# Patient Record
Sex: FEMALE | Race: WHITE | NOT HISPANIC OR LATINO | Employment: OTHER | URBAN - METROPOLITAN AREA
[De-identification: names, ages, dates, MRNs, and addresses within clinical notes are randomized per-mention and may not be internally consistent; named-entity substitution may affect disease eponyms.]

---

## 2017-01-01 ENCOUNTER — APPOINTMENT (OUTPATIENT)
Dept: GENERAL RADIOLOGY | Facility: HOSPITAL | Age: 82
End: 2017-01-01

## 2017-01-01 ENCOUNTER — APPOINTMENT (OUTPATIENT)
Dept: MRI IMAGING | Facility: HOSPITAL | Age: 82
End: 2017-01-01
Attending: HOSPITALIST

## 2017-01-01 ENCOUNTER — OFFICE VISIT (OUTPATIENT)
Dept: ORTHOPEDIC SURGERY | Facility: CLINIC | Age: 82
End: 2017-01-01

## 2017-01-01 ENCOUNTER — TELEPHONE (OUTPATIENT)
Dept: NEUROLOGY | Facility: CLINIC | Age: 82
End: 2017-01-01

## 2017-01-01 ENCOUNTER — APPOINTMENT (OUTPATIENT)
Dept: CARDIOLOGY | Facility: HOSPITAL | Age: 82
End: 2017-01-01
Attending: INTERNAL MEDICINE

## 2017-01-01 ENCOUNTER — OFFICE VISIT (OUTPATIENT)
Dept: NEUROLOGY | Facility: CLINIC | Age: 82
End: 2017-01-01

## 2017-01-01 ENCOUNTER — HOSPITAL ENCOUNTER (OUTPATIENT)
Facility: HOSPITAL | Age: 82
Setting detail: OBSERVATION
Discharge: HOME | End: 2017-12-02
Attending: EMERGENCY MEDICINE | Admitting: HOSPITALIST

## 2017-01-01 ENCOUNTER — APPOINTMENT (OUTPATIENT)
Dept: CT IMAGING | Facility: HOSPITAL | Age: 82
End: 2017-01-01

## 2017-01-01 VITALS
DIASTOLIC BLOOD PRESSURE: 78 MMHG | HEIGHT: 63 IN | HEART RATE: 73 BPM | SYSTOLIC BLOOD PRESSURE: 124 MMHG | BODY MASS INDEX: 28 KG/M2 | OXYGEN SATURATION: 93 % | WEIGHT: 158 LBS

## 2017-01-01 VITALS
RESPIRATION RATE: 18 BRPM | SYSTOLIC BLOOD PRESSURE: 156 MMHG | HEART RATE: 77 BPM | BODY MASS INDEX: 27.2 KG/M2 | WEIGHT: 153.5 LBS | DIASTOLIC BLOOD PRESSURE: 74 MMHG | HEIGHT: 63 IN | OXYGEN SATURATION: 97 % | TEMPERATURE: 98.3 F

## 2017-01-01 VITALS — TEMPERATURE: 98 F | HEIGHT: 63 IN

## 2017-01-01 DIAGNOSIS — M25.562 PAIN IN BOTH KNEES, UNSPECIFIED CHRONICITY: Primary | ICD-10-CM

## 2017-01-01 DIAGNOSIS — R53.1 GENERALIZED WEAKNESS: ICD-10-CM

## 2017-01-01 DIAGNOSIS — M25.561 PAIN IN BOTH KNEES, UNSPECIFIED CHRONICITY: Primary | ICD-10-CM

## 2017-01-01 DIAGNOSIS — I35.0 AORTIC VALVE STENOSIS, ETIOLOGY OF CARDIAC VALVE DISEASE UNSPECIFIED: Primary | ICD-10-CM

## 2017-01-01 DIAGNOSIS — R42 VERTIGO: ICD-10-CM

## 2017-01-01 DIAGNOSIS — G30.1 LATE ONSET ALZHEIMER'S DISEASE WITHOUT BEHAVIORAL DISTURBANCE (HCC): Primary | ICD-10-CM

## 2017-01-01 DIAGNOSIS — M17.10 PRIMARY LOCALIZED OSTEOARTHROSIS OF LOWER LEG, UNSPECIFIED LATERALITY: ICD-10-CM

## 2017-01-01 DIAGNOSIS — R55 NEAR SYNCOPE: ICD-10-CM

## 2017-01-01 DIAGNOSIS — F02.80 LATE ONSET ALZHEIMER'S DISEASE WITHOUT BEHAVIORAL DISTURBANCE (HCC): Primary | ICD-10-CM

## 2017-01-01 LAB
ALBUMIN SERPL-MCNC: 3.3 G/DL (ref 3.5–5.2)
ALBUMIN SERPL-MCNC: 3.7 G/DL (ref 3.5–5.2)
ALBUMIN/GLOB SERPL: 1.3 G/DL
ALBUMIN/GLOB SERPL: 1.3 G/DL
ALP SERPL-CCNC: 61 U/L (ref 39–117)
ALP SERPL-CCNC: 74 U/L (ref 39–117)
ALT SERPL W P-5'-P-CCNC: 12 U/L (ref 1–33)
ALT SERPL W P-5'-P-CCNC: 16 U/L (ref 1–33)
ANION GAP SERPL CALCULATED.3IONS-SCNC: 10.1 MMOL/L
ANION GAP SERPL CALCULATED.3IONS-SCNC: 10.7 MMOL/L
ANION GAP SERPL CALCULATED.3IONS-SCNC: 9.9 MMOL/L
AST SERPL-CCNC: 22 U/L (ref 1–32)
AST SERPL-CCNC: 29 U/L (ref 1–32)
BASOPHILS # BLD AUTO: 0.03 10*3/MM3 (ref 0–0.2)
BASOPHILS # BLD AUTO: 0.05 10*3/MM3 (ref 0–0.2)
BASOPHILS # BLD AUTO: 0.07 10*3/MM3 (ref 0–0.2)
BASOPHILS NFR BLD AUTO: 0.3 % (ref 0–1.5)
BASOPHILS NFR BLD AUTO: 0.6 % (ref 0–1.5)
BASOPHILS NFR BLD AUTO: 0.8 % (ref 0–1.5)
BH CV ECHO MEAS - ACS: 0.7 CM
BH CV ECHO MEAS - AI DEC SLOPE: 332.8 CM/SEC^2
BH CV ECHO MEAS - AI MAX PG: 83.2 MMHG
BH CV ECHO MEAS - AI MAX VEL: 456 CM/SEC
BH CV ECHO MEAS - AI P1/2T: 401.4 MSEC
BH CV ECHO MEAS - AO MAX PG (FULL): 36.9 MMHG
BH CV ECHO MEAS - AO MAX PG: 41 MMHG
BH CV ECHO MEAS - AO MEAN PG (FULL): 20 MMHG
BH CV ECHO MEAS - AO MEAN PG: 22 MMHG
BH CV ECHO MEAS - AO ROOT AREA (BSA CORRECTED): 2.1
BH CV ECHO MEAS - AO ROOT AREA: 10.8 CM^2
BH CV ECHO MEAS - AO ROOT DIAM: 3.7 CM
BH CV ECHO MEAS - AO V2 MAX: 320 CM/SEC
BH CV ECHO MEAS - AO V2 MEAN: 224 CM/SEC
BH CV ECHO MEAS - AO V2 VTI: 80.5 CM
BH CV ECHO MEAS - AVA(I,A): 1.1 CM^2
BH CV ECHO MEAS - AVA(I,D): 1.1 CM^2
BH CV ECHO MEAS - AVA(V,A): 1.2 CM^2
BH CV ECHO MEAS - AVA(V,D): 1.2 CM^2
BH CV ECHO MEAS - BSA(HAYCOCK): 1.8 M^2
BH CV ECHO MEAS - BSA: 1.7 M^2
BH CV ECHO MEAS - BZI_BMI: 27.5 KILOGRAMS/M^2
BH CV ECHO MEAS - BZI_METRIC_HEIGHT: 160 CM
BH CV ECHO MEAS - BZI_METRIC_WEIGHT: 70.3 KG
BH CV ECHO MEAS - CONTRAST EF (2CH): 59 ML/M^2
BH CV ECHO MEAS - CONTRAST EF 4CH: 60.5 ML/M^2
BH CV ECHO MEAS - EDV(CUBED): 148.9 ML
BH CV ECHO MEAS - EDV(MOD-SP2): 78 ML
BH CV ECHO MEAS - EDV(MOD-SP4): 114 ML
BH CV ECHO MEAS - EDV(TEICH): 135.3 ML
BH CV ECHO MEAS - EF(CUBED): 53.7 %
BH CV ECHO MEAS - EF(MOD-SP2): 59 %
BH CV ECHO MEAS - EF(MOD-SP4): 60.5 %
BH CV ECHO MEAS - EF(TEICH): 45.2 %
BH CV ECHO MEAS - ESV(CUBED): 68.9 ML
BH CV ECHO MEAS - ESV(MOD-SP2): 32 ML
BH CV ECHO MEAS - ESV(MOD-SP4): 45 ML
BH CV ECHO MEAS - ESV(TEICH): 74.2 ML
BH CV ECHO MEAS - FS: 22.6 %
BH CV ECHO MEAS - IVS/LVPW: 1.1
BH CV ECHO MEAS - IVSD: 1 CM
BH CV ECHO MEAS - LAT PEAK E' VEL: 6 CM/SEC
BH CV ECHO MEAS - LV DIASTOLIC VOL/BSA (35-75): 65.7 ML/M^2
BH CV ECHO MEAS - LV MASS(C)D: 187.3 GRAMS
BH CV ECHO MEAS - LV MASS(C)DI: 107.9 GRAMS/M^2
BH CV ECHO MEAS - LV MAX PG: 4.1 MMHG
BH CV ECHO MEAS - LV MEAN PG: 2 MMHG
BH CV ECHO MEAS - LV SYSTOLIC VOL/BSA (12-30): 25.9 ML/M^2
BH CV ECHO MEAS - LV V1 MAX: 101 CM/SEC
BH CV ECHO MEAS - LV V1 MEAN: 65.3 CM/SEC
BH CV ECHO MEAS - LV V1 VTI: 23 CM
BH CV ECHO MEAS - LVIDD: 5.3 CM
BH CV ECHO MEAS - LVIDS: 4.1 CM
BH CV ECHO MEAS - LVLD AP2: 7.7 CM
BH CV ECHO MEAS - LVLD AP4: 7.7 CM
BH CV ECHO MEAS - LVLS AP2: 6.9 CM
BH CV ECHO MEAS - LVLS AP4: 6.6 CM
BH CV ECHO MEAS - LVOT AREA (M): 3.8 CM^2
BH CV ECHO MEAS - LVOT AREA: 3.8 CM^2
BH CV ECHO MEAS - LVOT DIAM: 2.2 CM
BH CV ECHO MEAS - LVPWD: 0.9 CM
BH CV ECHO MEAS - MED PEAK E' VEL: 5 CM/SEC
BH CV ECHO MEAS - MV A DUR: 0.18 SEC
BH CV ECHO MEAS - MV A MAX VEL: 109 CM/SEC
BH CV ECHO MEAS - MV DEC SLOPE: 467 CM/SEC^2
BH CV ECHO MEAS - MV DEC TIME: 0.23 SEC
BH CV ECHO MEAS - MV E MAX VEL: 84.9 CM/SEC
BH CV ECHO MEAS - MV E/A: 0.78
BH CV ECHO MEAS - MV MAX PG: 6.5 MMHG
BH CV ECHO MEAS - MV MEAN PG: 2 MMHG
BH CV ECHO MEAS - MV P1/2T MAX VEL: 102 CM/SEC
BH CV ECHO MEAS - MV P1/2T: 64 MSEC
BH CV ECHO MEAS - MV V2 MAX: 127 CM/SEC
BH CV ECHO MEAS - MV V2 MEAN: 64.9 CM/SEC
BH CV ECHO MEAS - MV V2 VTI: 31.8 CM
BH CV ECHO MEAS - MVA P1/2T LCG: 2.2 CM^2
BH CV ECHO MEAS - MVA(P1/2T): 3.4 CM^2
BH CV ECHO MEAS - MVA(VTI): 2.7 CM^2
BH CV ECHO MEAS - PA ACC TIME: 0.11 SEC
BH CV ECHO MEAS - PA MAX PG (FULL): 5.5 MMHG
BH CV ECHO MEAS - PA MAX PG: 7 MMHG
BH CV ECHO MEAS - PA PR(ACCEL): 28.2 MMHG
BH CV ECHO MEAS - PA V2 MAX: 132 CM/SEC
BH CV ECHO MEAS - PULM A REVS DUR: 0.17 SEC
BH CV ECHO MEAS - PULM A REVS VEL: 39.3 CM/SEC
BH CV ECHO MEAS - PULM DIAS VEL: 54.3 CM/SEC
BH CV ECHO MEAS - PULM S/D: 1.5
BH CV ECHO MEAS - PULM SYS VEL: 83.9 CM/SEC
BH CV ECHO MEAS - PVA(V,A): 1.6 CM^2
BH CV ECHO MEAS - PVA(V,D): 1.6 CM^2
BH CV ECHO MEAS - QP/QS: 0.48
BH CV ECHO MEAS - RAP SYSTOLE: 3 MMHG
BH CV ECHO MEAS - RV MAX PG: 1.5 MMHG
BH CV ECHO MEAS - RV MEAN PG: 1 MMHG
BH CV ECHO MEAS - RV V1 MAX: 61.3 CM/SEC
BH CV ECHO MEAS - RV V1 MEAN: 38.3 CM/SEC
BH CV ECHO MEAS - RV V1 VTI: 12.2 CM
BH CV ECHO MEAS - RVOT AREA: 3.5 CM^2
BH CV ECHO MEAS - RVOT DIAM: 2.1 CM
BH CV ECHO MEAS - RVSP: 30 MMHG
BH CV ECHO MEAS - SI(AO): 498.8 ML/M^2
BH CV ECHO MEAS - SI(CUBED): 46.1 ML/M^2
BH CV ECHO MEAS - SI(LVOT): 50.4 ML/M^2
BH CV ECHO MEAS - SI(MOD-SP2): 26.5 ML/M^2
BH CV ECHO MEAS - SI(MOD-SP4): 39.8 ML/M^2
BH CV ECHO MEAS - SI(TEICH): 35.2 ML/M^2
BH CV ECHO MEAS - SV(AO): 865.5 ML
BH CV ECHO MEAS - SV(CUBED): 80 ML
BH CV ECHO MEAS - SV(LVOT): 87.4 ML
BH CV ECHO MEAS - SV(MOD-SP2): 46 ML
BH CV ECHO MEAS - SV(MOD-SP4): 69 ML
BH CV ECHO MEAS - SV(RVOT): 42.3 ML
BH CV ECHO MEAS - SV(TEICH): 61.1 ML
BH CV ECHO MEAS - TAPSE (>1.6): 2.3 CM2
BH CV ECHO MEAS - TR MAX VEL: 258 CM/SEC
BH CV VAS BP RIGHT ARM: NORMAL MMHG
BH CV XLRA - RV BASE: 4.3 CM
BH CV XLRA - RV LENGTH: 6.9 CM
BH CV XLRA - RV MID: 2.2 CM
BH CV XLRA - TDI S': 14 CM/SEC
BILIRUB SERPL-MCNC: 0.5 MG/DL (ref 0.1–1.2)
BILIRUB SERPL-MCNC: 0.5 MG/DL (ref 0.1–1.2)
BILIRUB UR QL STRIP: NEGATIVE
BUN BLD-MCNC: 10 MG/DL (ref 8–23)
BUN BLD-MCNC: 10 MG/DL (ref 8–23)
BUN BLD-MCNC: 7 MG/DL (ref 8–23)
BUN/CREAT SERPL: 13.7 (ref 7–25)
BUN/CREAT SERPL: 15.9 (ref 7–25)
BUN/CREAT SERPL: 16.7 (ref 7–25)
CALCIUM SPEC-SCNC: 8.5 MG/DL (ref 8.6–10.5)
CALCIUM SPEC-SCNC: 8.8 MG/DL (ref 8.6–10.5)
CALCIUM SPEC-SCNC: 9.1 MG/DL (ref 8.6–10.5)
CHLORIDE SERPL-SCNC: 101 MMOL/L (ref 98–107)
CHLORIDE SERPL-SCNC: 104 MMOL/L (ref 98–107)
CHLORIDE SERPL-SCNC: 109 MMOL/L (ref 98–107)
CHOLEST SERPL-MCNC: 160 MG/DL (ref 0–200)
CLARITY UR: ABNORMAL
CO2 SERPL-SCNC: 26.3 MMOL/L (ref 22–29)
CO2 SERPL-SCNC: 28.9 MMOL/L (ref 22–29)
CO2 SERPL-SCNC: 30.1 MMOL/L (ref 22–29)
COLOR UR: YELLOW
CREAT BLD-MCNC: 0.51 MG/DL (ref 0.57–1)
CREAT BLD-MCNC: 0.6 MG/DL (ref 0.57–1)
CREAT BLD-MCNC: 0.63 MG/DL (ref 0.57–1)
DEPRECATED RDW RBC AUTO: 55.2 FL (ref 37–54)
DEPRECATED RDW RBC AUTO: 55.3 FL (ref 37–54)
DEPRECATED RDW RBC AUTO: 56.2 FL (ref 37–54)
E/E' RATIO: 16
EOSINOPHIL # BLD AUTO: 0.14 10*3/MM3 (ref 0–0.7)
EOSINOPHIL # BLD AUTO: 0.18 10*3/MM3 (ref 0–0.7)
EOSINOPHIL # BLD AUTO: 0.23 10*3/MM3 (ref 0–0.7)
EOSINOPHIL NFR BLD AUTO: 1.5 % (ref 0.3–6.2)
EOSINOPHIL NFR BLD AUTO: 2.2 % (ref 0.3–6.2)
EOSINOPHIL NFR BLD AUTO: 2.8 % (ref 0.3–6.2)
ERYTHROCYTE [DISTWIDTH] IN BLOOD BY AUTOMATED COUNT: 14.9 % (ref 11.7–13)
ERYTHROCYTE [DISTWIDTH] IN BLOOD BY AUTOMATED COUNT: 15 % (ref 11.7–13)
ERYTHROCYTE [DISTWIDTH] IN BLOOD BY AUTOMATED COUNT: 15.1 % (ref 11.7–13)
GFR SERPL CREATININE-BSD FRML MDRD: 115 ML/MIN/1.73
GFR SERPL CREATININE-BSD FRML MDRD: 90 ML/MIN/1.73
GFR SERPL CREATININE-BSD FRML MDRD: 96 ML/MIN/1.73
GLOBULIN UR ELPH-MCNC: 2.5 GM/DL
GLOBULIN UR ELPH-MCNC: 2.9 GM/DL
GLUCOSE BLD-MCNC: 79 MG/DL (ref 65–99)
GLUCOSE BLD-MCNC: 88 MG/DL (ref 65–99)
GLUCOSE BLD-MCNC: 93 MG/DL (ref 65–99)
GLUCOSE UR STRIP-MCNC: NEGATIVE MG/DL
HBA1C MFR BLD: 4.61 % (ref 4.8–5.6)
HCT VFR BLD AUTO: 46.1 % (ref 35.6–45.5)
HCT VFR BLD AUTO: 48.2 % (ref 35.6–45.5)
HCT VFR BLD AUTO: 51.4 % (ref 35.6–45.5)
HDLC SERPL-MCNC: 69 MG/DL (ref 40–60)
HGB BLD-MCNC: 14.6 G/DL (ref 11.9–15.5)
HGB BLD-MCNC: 15.9 G/DL (ref 11.9–15.5)
HGB BLD-MCNC: 17.3 G/DL (ref 11.9–15.5)
HGB UR QL STRIP.AUTO: NEGATIVE
IMM GRANULOCYTES # BLD: 0 10*3/MM3 (ref 0–0.03)
IMM GRANULOCYTES # BLD: 0 10*3/MM3 (ref 0–0.03)
IMM GRANULOCYTES # BLD: 0.02 10*3/MM3 (ref 0–0.03)
IMM GRANULOCYTES NFR BLD: 0 % (ref 0–0.5)
IMM GRANULOCYTES NFR BLD: 0 % (ref 0–0.5)
IMM GRANULOCYTES NFR BLD: 0.2 % (ref 0–0.5)
KETONES UR QL STRIP: NEGATIVE
LDLC SERPL CALC-MCNC: 79 MG/DL (ref 0–100)
LDLC/HDLC SERPL: 1.14 {RATIO}
LEFT ATRIUM VOLUME INDEX: 33 ML/M2
LEFT ATRIUM VOLUME: 53 CM3
LEUKOCYTE ESTERASE UR QL STRIP.AUTO: NEGATIVE
LYMPHOCYTES # BLD AUTO: 1.97 10*3/MM3 (ref 0.9–4.8)
LYMPHOCYTES # BLD AUTO: 2.42 10*3/MM3 (ref 0.9–4.8)
LYMPHOCYTES # BLD AUTO: 2.62 10*3/MM3 (ref 0.9–4.8)
LYMPHOCYTES NFR BLD AUTO: 24.1 % (ref 19.6–45.3)
LYMPHOCYTES NFR BLD AUTO: 28.4 % (ref 19.6–45.3)
LYMPHOCYTES NFR BLD AUTO: 29.3 % (ref 19.6–45.3)
MCH RBC QN AUTO: 32.4 PG (ref 26.9–32)
MCH RBC QN AUTO: 33.5 PG (ref 26.9–32)
MCH RBC QN AUTO: 34 PG (ref 26.9–32)
MCHC RBC AUTO-ENTMCNC: 31.7 G/DL (ref 32.4–36.3)
MCHC RBC AUTO-ENTMCNC: 33 G/DL (ref 32.4–36.3)
MCHC RBC AUTO-ENTMCNC: 33.7 G/DL (ref 32.4–36.3)
MCV RBC AUTO: 101 FL (ref 80.5–98.2)
MCV RBC AUTO: 101.5 FL (ref 80.5–98.2)
MCV RBC AUTO: 102.4 FL (ref 80.5–98.2)
MONOCYTES # BLD AUTO: 1.01 10*3/MM3 (ref 0.2–1.2)
MONOCYTES # BLD AUTO: 1.04 10*3/MM3 (ref 0.2–1.2)
MONOCYTES # BLD AUTO: 1.12 10*3/MM3 (ref 0.2–1.2)
MONOCYTES NFR BLD AUTO: 10.9 % (ref 5–12)
MONOCYTES NFR BLD AUTO: 12.6 % (ref 5–12)
MONOCYTES NFR BLD AUTO: 13.7 % (ref 5–12)
NEUTROPHILS # BLD AUTO: 4.51 10*3/MM3 (ref 1.9–8.1)
NEUTROPHILS # BLD AUTO: 4.85 10*3/MM3 (ref 1.9–8.1)
NEUTROPHILS # BLD AUTO: 5.42 10*3/MM3 (ref 1.9–8.1)
NEUTROPHILS NFR BLD AUTO: 54.5 % (ref 42.7–76)
NEUTROPHILS NFR BLD AUTO: 58.7 % (ref 42.7–76)
NEUTROPHILS NFR BLD AUTO: 59.4 % (ref 42.7–76)
NITRITE UR QL STRIP: NEGATIVE
NT-PROBNP SERPL-MCNC: 582.5 PG/ML (ref 0–1800)
PH UR STRIP.AUTO: 7.5 [PH] (ref 5–8)
PLATELET # BLD AUTO: 157 10*3/MM3 (ref 140–500)
PLATELET # BLD AUTO: 172 10*3/MM3 (ref 140–500)
PLATELET # BLD AUTO: 177 10*3/MM3 (ref 140–500)
PMV BLD AUTO: 11.1 FL (ref 6–12)
PMV BLD AUTO: 11.1 FL (ref 6–12)
PMV BLD AUTO: 11.2 FL (ref 6–12)
POTASSIUM BLD-SCNC: 3.6 MMOL/L (ref 3.5–5.2)
PROT SERPL-MCNC: 5.8 G/DL (ref 6–8.5)
PROT SERPL-MCNC: 6.6 G/DL (ref 6–8.5)
PROT UR QL STRIP: NEGATIVE
RBC # BLD AUTO: 4.5 10*6/MM3 (ref 3.9–5.2)
RBC # BLD AUTO: 4.75 10*6/MM3 (ref 3.9–5.2)
RBC # BLD AUTO: 5.09 10*6/MM3 (ref 3.9–5.2)
SODIUM BLD-SCNC: 141 MMOL/L (ref 136–145)
SODIUM BLD-SCNC: 143 MMOL/L (ref 136–145)
SODIUM BLD-SCNC: 146 MMOL/L (ref 136–145)
SP GR UR STRIP: 1.01 (ref 1–1.03)
TRIGL SERPL-MCNC: 60 MG/DL (ref 0–150)
TROPONIN T SERPL-MCNC: <0.01 NG/ML (ref 0–0.03)
TSH SERPL DL<=0.05 MIU/L-ACNC: 2.83 MIU/ML (ref 0.27–4.2)
UROBILINOGEN UR QL STRIP: ABNORMAL
VLDLC SERPL-MCNC: 12 MG/DL (ref 5–40)
WBC NRBC COR # BLD: 8.17 10*3/MM3 (ref 4.5–10.7)
WBC NRBC COR # BLD: 8.27 10*3/MM3 (ref 4.5–10.7)
WBC NRBC COR # BLD: 9.24 10*3/MM3 (ref 4.5–10.7)

## 2017-01-01 PROCEDURE — G0378 HOSPITAL OBSERVATION PER HR: HCPCS

## 2017-01-01 PROCEDURE — 80048 BASIC METABOLIC PNL TOTAL CA: CPT | Performed by: HOSPITALIST

## 2017-01-01 PROCEDURE — 80053 COMPREHEN METABOLIC PANEL: CPT | Performed by: HOSPITALIST

## 2017-01-01 PROCEDURE — 94640 AIRWAY INHALATION TREATMENT: CPT

## 2017-01-01 PROCEDURE — 81003 URINALYSIS AUTO W/O SCOPE: CPT | Performed by: PHYSICIAN ASSISTANT

## 2017-01-01 PROCEDURE — 93306 TTE W/DOPPLER COMPLETE: CPT | Performed by: INTERNAL MEDICINE

## 2017-01-01 PROCEDURE — 85025 COMPLETE CBC W/AUTO DIFF WBC: CPT | Performed by: PHYSICIAN ASSISTANT

## 2017-01-01 PROCEDURE — 85025 COMPLETE CBC W/AUTO DIFF WBC: CPT | Performed by: HOSPITALIST

## 2017-01-01 PROCEDURE — 84484 ASSAY OF TROPONIN QUANT: CPT | Performed by: PHYSICIAN ASSISTANT

## 2017-01-01 PROCEDURE — 83036 HEMOGLOBIN GLYCOSYLATED A1C: CPT | Performed by: HOSPITALIST

## 2017-01-01 PROCEDURE — 83880 ASSAY OF NATRIURETIC PEPTIDE: CPT | Performed by: HOSPITALIST

## 2017-01-01 PROCEDURE — 70450 CT HEAD/BRAIN W/O DYE: CPT

## 2017-01-01 PROCEDURE — 93010 ELECTROCARDIOGRAM REPORT: CPT | Performed by: INTERNAL MEDICINE

## 2017-01-01 PROCEDURE — 94799 UNLISTED PULMONARY SVC/PX: CPT

## 2017-01-01 PROCEDURE — 70553 MRI BRAIN STEM W/O & W/DYE: CPT

## 2017-01-01 PROCEDURE — 20610 DRAIN/INJ JOINT/BURSA W/O US: CPT | Performed by: ORTHOPAEDIC SURGERY

## 2017-01-01 PROCEDURE — 99204 OFFICE O/P NEW MOD 45 MIN: CPT | Performed by: INTERNAL MEDICINE

## 2017-01-01 PROCEDURE — 84443 ASSAY THYROID STIM HORMONE: CPT | Performed by: HOSPITALIST

## 2017-01-01 PROCEDURE — 96361 HYDRATE IV INFUSION ADD-ON: CPT

## 2017-01-01 PROCEDURE — 25810000003 SODIUM CHLORIDE 0.9 % WITH KCL 20 MEQ 20-0.9 MEQ/L-% SOLUTION: Performed by: HOSPITALIST

## 2017-01-01 PROCEDURE — 99213 OFFICE O/P EST LOW 20 MIN: CPT | Performed by: ORTHOPAEDIC SURGERY

## 2017-01-01 PROCEDURE — 0 GADOBENATE DIMEGLUMINE 529 MG/ML SOLUTION: Performed by: HOSPITALIST

## 2017-01-01 PROCEDURE — 99215 OFFICE O/P EST HI 40 MIN: CPT | Performed by: PSYCHIATRY & NEUROLOGY

## 2017-01-01 PROCEDURE — 0399T ADULT TRANSTHORACIC ECHO COMPLETE W/ CONT IF NECESSARY PER PROTOCOL: CPT | Performed by: INTERNAL MEDICINE

## 2017-01-01 PROCEDURE — 99284 EMERGENCY DEPT VISIT MOD MDM: CPT

## 2017-01-01 PROCEDURE — A9577 INJ MULTIHANCE: HCPCS | Performed by: HOSPITALIST

## 2017-01-01 PROCEDURE — 93005 ELECTROCARDIOGRAM TRACING: CPT | Performed by: PHYSICIAN ASSISTANT

## 2017-01-01 PROCEDURE — 80061 LIPID PANEL: CPT | Performed by: HOSPITALIST

## 2017-01-01 PROCEDURE — 93306 TTE W/DOPPLER COMPLETE: CPT

## 2017-01-01 PROCEDURE — 96360 HYDRATION IV INFUSION INIT: CPT

## 2017-01-01 PROCEDURE — 80053 COMPREHEN METABOLIC PANEL: CPT | Performed by: PHYSICIAN ASSISTANT

## 2017-01-01 PROCEDURE — 71020 HC CHEST PA AND LATERAL: CPT

## 2017-01-01 PROCEDURE — 99212 OFFICE O/P EST SF 10 MIN: CPT | Performed by: INTERNAL MEDICINE

## 2017-01-01 PROCEDURE — 73562 X-RAY EXAM OF KNEE 3: CPT | Performed by: ORTHOPAEDIC SURGERY

## 2017-01-01 PROCEDURE — 0399T HC MYOCARDL STRAIN IMAG QUAN ASSMT PER SESS: CPT

## 2017-01-01 RX ORDER — ASPIRIN 81 MG/1
81 TABLET ORAL DAILY
Qty: 30 TABLET | Refills: 0 | Status: SHIPPED | OUTPATIENT
Start: 2017-01-01 | End: 2018-01-01

## 2017-01-01 RX ORDER — SODIUM CHLORIDE AND POTASSIUM CHLORIDE 150; 900 MG/100ML; MG/100ML
75 INJECTION, SOLUTION INTRAVENOUS CONTINUOUS
Status: DISCONTINUED | OUTPATIENT
Start: 2017-01-01 | End: 2017-01-01

## 2017-01-01 RX ORDER — MEMANTINE HYDROCHLORIDE 10 MG/1
10 TABLET ORAL 2 TIMES DAILY
Refills: 0 | Status: ON HOLD | COMMUNITY
Start: 2017-01-01 | End: 2018-01-01

## 2017-01-01 RX ORDER — ATORVASTATIN CALCIUM 20 MG/1
40 TABLET, FILM COATED ORAL NIGHTLY
Status: DISCONTINUED | OUTPATIENT
Start: 2017-01-01 | End: 2017-01-01

## 2017-01-01 RX ORDER — ATORVASTATIN CALCIUM 10 MG/1
10 TABLET, FILM COATED ORAL NIGHTLY
Status: DISCONTINUED | OUTPATIENT
Start: 2017-01-01 | End: 2017-01-01 | Stop reason: HOSPADM

## 2017-01-01 RX ORDER — BUPIVACAINE HYDROCHLORIDE 5 MG/ML
4 INJECTION, SOLUTION EPIDURAL; INTRACAUDAL
Status: COMPLETED | OUTPATIENT
Start: 2017-01-01 | End: 2017-01-01

## 2017-01-01 RX ORDER — ASPIRIN 325 MG
325 TABLET ORAL ONCE
Status: DISCONTINUED | OUTPATIENT
Start: 2017-01-01 | End: 2017-01-01

## 2017-01-01 RX ORDER — ALPRAZOLAM 0.5 MG/1
0.5 TABLET ORAL 4 TIMES DAILY PRN
Status: DISCONTINUED | OUTPATIENT
Start: 2017-01-01 | End: 2017-01-01

## 2017-01-01 RX ORDER — ATORVASTATIN CALCIUM 10 MG/1
10 TABLET, FILM COATED ORAL NIGHTLY
Qty: 30 TABLET | Refills: 0 | Status: SHIPPED | OUTPATIENT
Start: 2017-01-01 | End: 2018-01-01

## 2017-01-01 RX ORDER — NICOTINE 21 MG/24HR
1 PATCH, TRANSDERMAL 24 HOURS TRANSDERMAL EVERY 24 HOURS
Status: DISCONTINUED | OUTPATIENT
Start: 2017-01-01 | End: 2017-01-01

## 2017-01-01 RX ORDER — AMLODIPINE BESYLATE 10 MG/1
10 TABLET ORAL
Qty: 30 TABLET | Refills: 0 | Status: SHIPPED | OUTPATIENT
Start: 2017-01-01 | End: 2018-01-01

## 2017-01-01 RX ORDER — ASPIRIN 81 MG/1
81 TABLET ORAL DAILY
Status: DISCONTINUED | OUTPATIENT
Start: 2017-01-01 | End: 2017-01-01 | Stop reason: HOSPADM

## 2017-01-01 RX ORDER — AMLODIPINE BESYLATE 5 MG/1
5 TABLET ORAL
Status: DISCONTINUED | OUTPATIENT
Start: 2017-01-01 | End: 2017-01-01

## 2017-01-01 RX ORDER — PANTOPRAZOLE SODIUM 40 MG/1
40 TABLET, DELAYED RELEASE ORAL
Status: DISCONTINUED | OUTPATIENT
Start: 2017-01-01 | End: 2017-01-01 | Stop reason: HOSPADM

## 2017-01-01 RX ORDER — AMLODIPINE BESYLATE 10 MG/1
10 TABLET ORAL
Status: DISCONTINUED | OUTPATIENT
Start: 2017-01-01 | End: 2017-01-01 | Stop reason: HOSPADM

## 2017-01-01 RX ORDER — PANTOPRAZOLE SODIUM 40 MG/1
40 TABLET, DELAYED RELEASE ORAL DAILY
Qty: 30 TABLET | Refills: 0 | Status: SHIPPED | OUTPATIENT
Start: 2017-01-01 | End: 2018-01-01

## 2017-01-01 RX ORDER — MULTIPLE VITAMINS W/ MINERALS TAB 9MG-400MCG
1 TAB ORAL DAILY
Status: DISCONTINUED | OUTPATIENT
Start: 2017-01-01 | End: 2017-01-01 | Stop reason: HOSPADM

## 2017-01-01 RX ORDER — ASPIRIN 300 MG/1
300 SUPPOSITORY RECTAL ONCE
Status: DISCONTINUED | OUTPATIENT
Start: 2017-01-01 | End: 2017-01-01

## 2017-01-01 RX ORDER — IPRATROPIUM BROMIDE AND ALBUTEROL SULFATE 2.5; .5 MG/3ML; MG/3ML
3 SOLUTION RESPIRATORY (INHALATION)
Status: DISCONTINUED | OUTPATIENT
Start: 2017-01-01 | End: 2017-01-01

## 2017-01-01 RX ORDER — METHYLPREDNISOLONE ACETATE 80 MG/ML
80 INJECTION, SUSPENSION INTRA-ARTICULAR; INTRALESIONAL; INTRAMUSCULAR; SOFT TISSUE
Status: COMPLETED | OUTPATIENT
Start: 2017-01-01 | End: 2017-01-01

## 2017-01-01 RX ORDER — CETIRIZINE HYDROCHLORIDE 10 MG/1
10 TABLET ORAL DAILY
Status: DISCONTINUED | OUTPATIENT
Start: 2017-01-01 | End: 2017-01-01

## 2017-01-01 RX ORDER — FEXOFENADINE HCL 180 MG
TABLET ORAL
Refills: 0 | Status: ON HOLD | COMMUNITY
Start: 2017-01-01 | End: 2017-01-01

## 2017-01-01 RX ORDER — SODIUM CHLORIDE 0.9 % (FLUSH) 0.9 %
10 SYRINGE (ML) INJECTION AS NEEDED
Status: DISCONTINUED | OUTPATIENT
Start: 2017-01-01 | End: 2017-01-01 | Stop reason: HOSPADM

## 2017-01-01 RX ORDER — MEMANTINE HYDROCHLORIDE 10 MG/1
10 TABLET ORAL 2 TIMES DAILY
Status: DISCONTINUED | OUTPATIENT
Start: 2017-01-01 | End: 2017-01-01 | Stop reason: HOSPADM

## 2017-01-01 RX ADMIN — METHYLPREDNISOLONE ACETATE 80 MG: 80 INJECTION, SUSPENSION INTRA-ARTICULAR; INTRALESIONAL; INTRAMUSCULAR; SOFT TISSUE at 13:24

## 2017-01-01 RX ADMIN — IPRATROPIUM BROMIDE AND ALBUTEROL SULFATE 3 ML: .5; 3 SOLUTION RESPIRATORY (INHALATION) at 23:04

## 2017-01-01 RX ADMIN — IPRATROPIUM BROMIDE AND ALBUTEROL SULFATE 3 ML: .5; 3 SOLUTION RESPIRATORY (INHALATION) at 16:24

## 2017-01-01 RX ADMIN — IPRATROPIUM BROMIDE AND ALBUTEROL SULFATE 3 ML: .5; 3 SOLUTION RESPIRATORY (INHALATION) at 11:13

## 2017-01-01 RX ADMIN — ALPRAZOLAM 0.5 MG: 0.5 TABLET ORAL at 22:07

## 2017-01-01 RX ADMIN — ALPRAZOLAM 0.5 MG: 0.5 TABLET ORAL at 20:17

## 2017-01-01 RX ADMIN — PANTOPRAZOLE SODIUM 40 MG: 40 TABLET, DELAYED RELEASE ORAL at 06:24

## 2017-01-01 RX ADMIN — AMLODIPINE BESYLATE 10 MG: 10 TABLET ORAL at 08:59

## 2017-01-01 RX ADMIN — BUPIVACAINE HYDROCHLORIDE 4 ML: 5 INJECTION, SOLUTION EPIDURAL; INTRACAUDAL at 13:24

## 2017-01-01 RX ADMIN — MEMANTINE HYDROCHLORIDE 10 MG: 10 TABLET, FILM COATED ORAL at 17:22

## 2017-01-01 RX ADMIN — POTASSIUM CHLORIDE AND SODIUM CHLORIDE 75 ML/HR: 900; 150 INJECTION, SOLUTION INTRAVENOUS at 02:58

## 2017-01-01 RX ADMIN — NICOTINE 1 PATCH: 14 PATCH TRANSDERMAL at 14:54

## 2017-01-01 RX ADMIN — GADOBENATE DIMEGLUMINE 14 ML: 529 INJECTION, SOLUTION INTRAVENOUS at 07:23

## 2017-01-01 RX ADMIN — IPRATROPIUM BROMIDE AND ALBUTEROL SULFATE 3 ML: .5; 3 SOLUTION RESPIRATORY (INHALATION) at 19:24

## 2017-01-01 RX ADMIN — ATORVASTATIN CALCIUM 40 MG: 20 TABLET, FILM COATED ORAL at 20:17

## 2017-01-01 RX ADMIN — IPRATROPIUM BROMIDE AND ALBUTEROL SULFATE 3 ML: .5; 3 SOLUTION RESPIRATORY (INHALATION) at 03:11

## 2017-01-01 RX ADMIN — MULTIPLE VITAMINS W/ MINERALS TAB 1 TABLET: TAB at 08:59

## 2017-01-01 RX ADMIN — MEMANTINE HYDROCHLORIDE 10 MG: 10 TABLET, FILM COATED ORAL at 09:00

## 2017-01-01 RX ADMIN — MULTIPLE VITAMINS W/ MINERALS TAB 1 TABLET: TAB at 14:53

## 2017-01-01 RX ADMIN — AMLODIPINE BESYLATE 5 MG: 5 TABLET ORAL at 09:59

## 2017-01-01 RX ADMIN — PANTOPRAZOLE SODIUM 40 MG: 40 TABLET, DELAYED RELEASE ORAL at 06:09

## 2017-01-01 RX ADMIN — CETIRIZINE HYDROCHLORIDE 10 MG: 10 TABLET, FILM COATED ORAL at 14:52

## 2017-01-01 RX ADMIN — ASPIRIN 81 MG: 81 TABLET ORAL at 09:59

## 2017-01-01 RX ADMIN — PANTOPRAZOLE SODIUM 40 MG: 40 TABLET, DELAYED RELEASE ORAL at 09:59

## 2017-01-01 RX ADMIN — IPRATROPIUM BROMIDE AND ALBUTEROL SULFATE 3 ML: .5; 3 SOLUTION RESPIRATORY (INHALATION) at 07:57

## 2017-01-01 RX ADMIN — CETIRIZINE HYDROCHLORIDE 10 MG: 10 TABLET, FILM COATED ORAL at 08:59

## 2017-01-01 RX ADMIN — ASPIRIN 81 MG: 81 TABLET ORAL at 08:59

## 2017-01-01 RX ADMIN — POTASSIUM CHLORIDE AND SODIUM CHLORIDE 75 ML/HR: 900; 150 INJECTION, SOLUTION INTRAVENOUS at 23:03

## 2017-01-24 ENCOUNTER — TRANSCRIBE ORDERS (OUTPATIENT)
Dept: ADMINISTRATIVE | Facility: HOSPITAL | Age: 82
End: 2017-01-24

## 2017-01-24 DIAGNOSIS — Z12.31 VISIT FOR SCREENING MAMMOGRAM: Primary | ICD-10-CM

## 2017-01-25 ENCOUNTER — APPOINTMENT (OUTPATIENT)
Dept: WOMENS IMAGING | Facility: HOSPITAL | Age: 82
End: 2017-01-25

## 2017-01-25 PROCEDURE — MDREVSPNEW: Performed by: RADIOLOGY

## 2017-01-25 PROCEDURE — G0279 TOMOSYNTHESIS, MAMMO: HCPCS | Performed by: RADIOLOGY

## 2017-01-25 PROCEDURE — G0204 DX MAMMO INCL CAD BI: HCPCS | Performed by: RADIOLOGY

## 2017-04-04 ENCOUNTER — OFFICE VISIT (OUTPATIENT)
Dept: NEUROLOGY | Facility: CLINIC | Age: 82
End: 2017-04-04

## 2017-04-04 VITALS
WEIGHT: 158 LBS | HEIGHT: 63 IN | DIASTOLIC BLOOD PRESSURE: 70 MMHG | BODY MASS INDEX: 28 KG/M2 | SYSTOLIC BLOOD PRESSURE: 140 MMHG

## 2017-04-04 DIAGNOSIS — F02.80 LATE ONSET ALZHEIMER'S DISEASE WITHOUT BEHAVIORAL DISTURBANCE (HCC): Primary | ICD-10-CM

## 2017-04-04 DIAGNOSIS — G30.1 LATE ONSET ALZHEIMER'S DISEASE WITHOUT BEHAVIORAL DISTURBANCE (HCC): Primary | ICD-10-CM

## 2017-04-04 PROCEDURE — 99204 OFFICE O/P NEW MOD 45 MIN: CPT | Performed by: PSYCHIATRY & NEUROLOGY

## 2017-04-04 RX ORDER — FEXOFENADINE HCL 180 MG
TABLET ORAL
Refills: 0 | COMMUNITY
Start: 2017-03-03 | End: 2017-01-01

## 2017-04-04 RX ORDER — FUROSEMIDE 40 MG/1
20 TABLET ORAL DAILY
Refills: 0 | Status: ON HOLD | COMMUNITY
Start: 2017-03-14 | End: 2017-01-01

## 2017-04-04 RX ORDER — AMLODIPINE BESYLATE 5 MG/1
TABLET ORAL
Refills: 0 | COMMUNITY
Start: 2017-02-14 | End: 2017-01-01 | Stop reason: HOSPADM

## 2017-04-04 RX ORDER — MEMANTINE HYDROCHLORIDE 5 MG/1
TABLET ORAL
Refills: 0 | COMMUNITY
Start: 2017-02-20 | End: 2017-01-01

## 2017-04-04 NOTE — PROGRESS NOTES
"CC: Memory loss    HPI:  Vanna Gardner is a  82 y.o.  right-handed white female who I have been asked to see in neurologic consultation by Dr. Ball regarding development of dementia.  She was seen previously by Dr. Aguilera 2013.  She was sent for neuropsychometric testing to Dr. Puckett's office.  She was not diagnosed with MCI or dementia at that time.  She had one test showing some frontal executive function problems involving novel problem solving and mental flexibility.  Depression was diagnosed and treatment was recommended.  Since that time her memory has worsened.  She lives alone but she has a 6 hour per day caregiver who does all of the chores in the home.  She dresses herself dates and eats independently but does not wash dishes clean the house wash\" etc.  She has chronic back pain and bilateral knee pain which affects her walking and she uses a small-based quad cane.  She has urinary incontinence which has been present for at least a couple of years.  She was started on Namenda and has not yet titrated up to the target dose of 10 mg by mouth twice a day.  She indicates she has never been on any other medication for her memory.  She denies any side effects at this point to the medication.    She occasionally awakens from sleep believing she has heard the doorbell.  She does not go to the door.  It does concern her some.  She does not see things which other people do not see.  She denies numbness tingling or burning in her feet.  There is a history of some dementia in her mother who  at age 99.  Her father  at 65 of a motor vehicle accident and did not have any memory issue.  She denies history of significant head trauma, meningitis seizures stroke or syncope        History reviewed. No pertinent past medical history.      Past Surgical History:   Procedure Laterality Date   • BLADDER SURGERY     • EYE SURGERY      eyelid    • HYSTERECTOMY     • TOTAL HIP ARTHROPLASTY             Current " Outpatient Prescriptions:   •  amLODIPine (NORVASC) 5 MG tablet, take 1 tablet by mouth once daily, Disp: , Rfl: 0  •  furosemide (LASIX) 40 MG tablet, , Disp: , Rfl: 0  •  memantine (NAMENDA) 5 MG tablet, take 1 tablet by mouth every morning for 1 week then 1 tablet twi...  (REFER TO PRESCRIPTION NOTES)., Disp: , Rfl: 0  •  Multiple Vitamins-Minerals (MULTIVITAMIN PO), Take  by mouth., Disp: , Rfl:   •  Multiple Vitamins-Minerals (OCUVITE ADULT 50+ PO), Take  by mouth., Disp: , Rfl:   •  RA ALLERGY RELIEF 180 MG tablet, take 1 tablet by mouth once daily, Disp: , Rfl: 0      History reviewed. No pertinent family history.      Social History     Social History   • Marital status:      Spouse name: N/A   • Number of children: N/A   • Years of education: N/A     Occupational History   • Not on file.     Social History Main Topics   • Smoking status: Current Every Day Smoker     Types: Cigarettes   • Smokeless tobacco: Not on file   • Alcohol use Yes   • Drug use: Not on file   • Sexual activity: Not on file     Other Topics Concern   • Not on file     Social History Narrative   • No narrative on file         Allergies   Allergen Reactions   • Codeine      rash   • Hydrochlorothiazide    • Latex    • Nickel    • Penicillins          Pain Scale:5/10, back and knees        ROS:  Review of Systems   Constitutional: Negative for activity change, appetite change and fatigue.   HENT: Negative for ear pain, facial swelling and hearing loss.    Eyes: Positive for visual disturbance. Negative for pain, redness and itching.   Respiratory: Positive for cough. Negative for choking and shortness of breath.    Cardiovascular: Negative for chest pain and leg swelling.   Gastrointestinal: Negative for abdominal pain, nausea and vomiting.   Endocrine: Negative for cold intolerance and heat intolerance.   Genitourinary: Positive for enuresis.   Musculoskeletal: Positive for arthralgias, back pain and gait problem.   Skin: Negative  "for color change, pallor, rash and wound.   Allergic/Immunologic: Negative for environmental allergies and food allergies.   Neurological: Negative for dizziness, tremors, seizures, syncope, facial asymmetry, speech difficulty, weakness, light-headedness, numbness and headaches.   Psychiatric/Behavioral: Positive for agitation, confusion and sleep disturbance. Negative for behavioral problems, decreased concentration, dysphoric mood, hallucinations, self-injury and suicidal ideas. The patient is not nervous/anxious and is not hyperactive.            Physical Exam:  Vitals:    04/04/17 1002   BP: 140/70   Weight: 158 lb (71.7 kg)   Height: 63\" (160 cm)     Body mass index is 27.99 kg/(m^2).    Physical Exam  Gen.: Overweight white female no acute distress  HEENT: Normocephalic no evidence of trauma.  Discs are flat.  She is status post cataract extractions.  No A-V nicking.  Throat negative.  Neck: Supple.  No thyromegaly.  No cervical bruits.  Radial pulses were strong and simultaneous.  Heart: Regular rate and rhythm without murmurs      Neurological Exam:   Mental status: Awake alert oriented and conversant without evidence of an affective disorder thought disorder delusions or hallucinations.  HCF: No aphasia or apraxia or dysarthria.  Mild short-term memory problems noted.  Her Mini-Mental state score was 26.5 with clock draw 3/4 with improper placement of the hands.  Animal naming yielded 8 animals in 1 minute.  She completed at about 35 seconds and then stalled out.  Knowledge of recent events intact.  Cranial nerves: 2-12 intact with some reduction of hearing  Motor: Normal tone arms and legs.  Normal bulk.  Full power in all muscles tested in the arms and legs.  Sensory: Light touch and pinprick are diffusely intact.  Vibration and position sense is intact in the feet.  Cerebellar: Finger to nose, rapid movements, heel-to-shin were intact  Gait and Station: Relatively short steps.  Uses her small base quad " cane.  Some neck flexion forward present        Results:      Lab Results   Component Value Date    GLUCOSE 83 03/04/2014    BUN 10 03/04/2014    CREATININE 0.62 03/04/2014    CO2 25 03/04/2014    CALCIUM 9.2 03/04/2014    ALBUMIN 4.0 03/04/2014    AST 22 03/04/2014    ALT 13 03/04/2014       Lab Results   Component Value Date    WBC 9.05 03/04/2014    HGB 16.5 (H) 03/04/2014    HCT 48.2 (H) 03/04/2014    MCV 95.4 03/04/2014     03/04/2014         .No results found for: RPR      No results found for: TSH, G6UWWLV, P5GAZXP, THYROIDAB      No results found for: YFFDICKS91      No results found for: FOLATE      No results found for: HGBA1C          Assessment:   1.  Memory loss-appears more deficits present currently with some executive dysfunctioning, short-term memory problems and some constructive dyspraxia.  This is most likely early Alzheimer's disease.  She had a previous MRI of the brain which was available Dr. Aguilera but not to me.  He mentioned some small vessel changes but he did not mention hydrocephalus.  She has lab work done through Dr. Ball's office but I do not have that available to me at this time  2.  Gait disturbance-nonspecific.  It is not a parkinsonian gait and does not appear to me at magnetic gait.        Plan:  1.  MRI of the brain  2.  We will obtain labs from Dr. Ball's office.  I am looking for thyroid blood tests B12 and folate in particular.  3.  Return in about 2 months at which point she should have been on full dose Namenda for a while.  Option to place her on a cholinesterase inhibitor such as Aricept, Exelon or galantamine would be made at that time.          At least 30 minutes of this 60 minute consult was used for counseling regarding the diagnosis and treatments which are available                Much of this encounter note is an electronic transcription/translation of spoken language to printed text. The electronic translation of spoken language may permit erroneous,  or at times, nonsensical words or phrases to be inadvertently transcribed; although I have reviewed the note for such errors, some may still exist.

## 2017-04-24 ENCOUNTER — HOSPITAL ENCOUNTER (OUTPATIENT)
Dept: MRI IMAGING | Facility: HOSPITAL | Age: 82
Discharge: HOME | End: 2017-04-24
Attending: PSYCHIATRY & NEUROLOGY | Admitting: PSYCHIATRY & NEUROLOGY

## 2017-04-24 DIAGNOSIS — F02.80 LATE ONSET ALZHEIMER'S DISEASE WITHOUT BEHAVIORAL DISTURBANCE (HCC): ICD-10-CM

## 2017-04-24 DIAGNOSIS — G30.1 LATE ONSET ALZHEIMER'S DISEASE WITHOUT BEHAVIORAL DISTURBANCE (HCC): ICD-10-CM

## 2017-04-24 PROCEDURE — 70551 MRI BRAIN STEM W/O DYE: CPT

## 2017-04-27 ENCOUNTER — TELEPHONE (OUTPATIENT)
Dept: NEUROLOGY | Facility: CLINIC | Age: 82
End: 2017-04-27

## 2017-08-31 PROBLEM — I10 BENIGN ESSENTIAL HTN: Status: ACTIVE | Noted: 2017-01-01

## 2017-09-18 NOTE — TELEPHONE ENCOUNTER
----- Message from Sveta Powell sent at 9/15/2017 12:06 PM EDT -----  Contact: [phone]  Pts son is calling about his mothers medical records from her PCP?  Fabrice  [phone]

## 2017-10-03 NOTE — PROGRESS NOTES
"Bilateral Knee Joint Injection      Patient: Vanna Gardner        YOB: 1934            Chief Complaints:BilateralKnee pain  Chief Complaint   Patient presents with   • Right Knee - Follow-up   • Left Knee - Follow-up           History of Present Illness: Pt gets intermittent  injections with good relief. Is here for repeat injection. Understands options.      Physical Exam: 82 y.o. female  General Appearance:    Alert, cooperative, in no acute distress                   Vitals:    10/03/17 1321   Temp: 98 °F (36.7 °C)   TempSrc: Temporal Artery    Height: 63\" (160 cm)      Patient is alert and read ×3 no acute distress appears her above-listed at height weight and age.  Affect is normal respiratory rate is normal unlabored. Heart rate regular rate rhythm, sclera, dentition and hearing are normal for the purpose of this exam.  Exam and complaints are unchanged.      Procedure:  Large Joint Arthrocentesis  Date/Time: 10/3/2017 1:23 PM  Consent given by: patient  Site marked: site marked  Timeout: Immediately prior to procedure a time out was called to verify the correct patient, procedure, equipment, support staff and site/side marked as required   Supporting Documentation  Indications: pain   Procedure Details  Location: knee - L knee  Needle size: 25 G  Approach: anteromedial  Medications administered: 80 mg methylPREDNISolone acetate 80 MG/ML; 4 mL bupivacaine (PF) 0.5 %      Large Joint Arthrocentesis  Date/Time: 10/3/2017 1:23 PM  Consent given by: patient  Site marked: site marked  Timeout: Immediately prior to procedure a time out was called to verify the correct patient, procedure, equipment, support staff and site/side marked as required   Supporting Documentation  Indications: pain   Procedure Details  Location: knee - R knee  Needle size: 25 G  Approach: anteromedial  Medications administered: 80 mg methylPREDNISolone acetate 80 MG/ML; 4 mL bupivacaine (PF) 0.5 " %                  Assessment. Persistent knee pain      Plan: Is to proceed with injection    The knee joint was injected under strict sterile technique with Marcaine and Depo-Medrol this was done sterilely and tolerated tolerated well.

## 2017-10-06 NOTE — PROGRESS NOTES
New Knee      Patient: Vanna Gardner        YOB: 1934    Medical Record Number: [id number]        Chief Complaints: Right knee pain greater than left knee pain      History of Present Illness: This is a  82 y.o. female who has seen Dr. Lim in the past who presents complaining of bilateral knee pain right is worse and left no new history injury change in activity but progression of her symptoms.  Her symptoms are moderate intermittent some associated swelling but definite limitation of her activities.  No new history of injury.  Her past medical history is remarkable for high blood pressure      Allergies:   Allergies   Allergen Reactions   • Codeine      rash   • Hydrochlorothiazide    • Latex    • Nickel    • Penicillins        Medications:   Home Medications:  Current Outpatient Prescriptions on File Prior to Visit   Medication Sig   • amLODIPine (NORVASC) 5 MG tablet take 1 tablet by mouth once daily   • FEXOFENADINE HCL PO Take 180 tablets by mouth Daily.   • furosemide (LASIX) 40 MG tablet    • memantine (NAMENDA) 10 MG tablet    • Multiple Vitamins-Minerals (MULTIVITAMIN PO) Take  by mouth.   • Multiple Vitamins-Minerals (OCUVITE ADULT 50+ PO) Take  by mouth.     No current facility-administered medications on file prior to visit.      Current Medications:  Scheduled Meds:  Continuous Infusions:  No current facility-administered medications for this visit.   PRN Meds:.    History reviewed. No pertinent past medical history.     Past Surgical History:   Procedure Laterality Date   • BLADDER SURGERY     • EYE SURGERY      eyelid    • HYSTERECTOMY     • TOTAL HIP ARTHROPLASTY          Social History     Occupational History   • Not on file.     Social History Main Topics   • Smoking status: Current Every Day Smoker     Types: Cigarettes   • Smokeless tobacco: Not on file   • Alcohol use Yes   • Drug use: Not on file   • Sexual activity: Not on file    Social History     Social History  "Narrative      History reviewed. No pertinent family history.          Review of Systems: 14 point review of systems are remarkable for the knee pain remainder are negative  Review of Systems      Physical Exam: 82 y.o. female  General Appearance:    Alert, cooperative, in no acute distress                 Vitals:    10/03/17 1321   Temp: 98 °F (36.7 °C)   TempSrc: Temporal Artery    Height: 63\" (160 cm)      Patient is alert and read ×3 no acute distress appears her above-listed at height weight and age.  Affect is normal respiratory rate is normal unlabored. Heart rate regular rate rhythm, sclera, dentition and hearing are normal for the purpose of this exam.        Ortho Exam Physical exam of the right knee reveals no effusion, no erythema.  It mild loss of extension and full flexion  Patient has mild varus alignment.  They have mild tenderness to palpation about the medial compartment, no tenderness laterally..  The patient has a negative bounce home, negative Kyrie and a stable ligamentous exam.  Quad tone is reasonable and symmetric.  There are no overlying skin changes no lymphedema no lymphadenopathy.  There is good hip range of motion which is full symmetric and asymptomatic and a normal ankle exam.  Physical exam of the left knee reveals no effusion, no erythema.  It mild loss of extension and full flexion  Patient has mild varus alignment.  They have mild tenderness to palpation about the medial compartment, no tenderness laterally..  The patient has a negative bounce home, negative Kyrie and a stable ligamentous exam.  Quad tone is reasonable and symmetric.  There are no overlying skin changes no lymphedema no lymphadenopathy.  There is good hip range of motion which is full symmetric and asymptomatic and a normal ankle exam.        Large Joint Arthrocentesis  Date/Time: 10/3/2017 1:24 PM  Consent given by: patient  Site marked: site marked  Timeout: Immediately prior to procedure a time out was " called to verify the correct patient, procedure, equipment, support staff and site/side marked as required   Supporting Documentation  Indications: pain   Procedure Details  Location: knee - R knee  Preparation: Patient was prepped and draped in the usual sterile fashion  Needle size: 25 G  Approach: anteromedial  Medications administered: 80 mg methylPREDNISolone acetate 80 MG/ML; 4 mL bupivacaine (PF) 0.5 %      Large Joint Arthrocentesis  Date/Time: 10/3/2017 1:24 PM  Consent given by: patient  Site marked: site marked  Timeout: Immediately prior to procedure a time out was called to verify the correct patient, procedure, equipment, support staff and site/side marked as required   Supporting Documentation  Indications: pain   Procedure Details  Location: knee - L knee  Needle size: 25 G  Approach: anteromedial  Medications administered: 80 mg methylPREDNISolone acetate 80 MG/ML; 4 mL bupivacaine (PF) 0.5 %                     Radiology:   AP, Lateral and merchant views of the right and left knee  were ordered/reviewed to evauateknee pain.  I have compared to previous films these show tricompartmental OA most pronounced medially with near complete loss of       Assessment/Plan:    Bilateral knee pain with degenerative changes of think she benefit from an injection we are great she is not an operative candidate we talked about options steroid injection Synvisc will start with the steroid of both she understands her options from this point

## 2017-11-16 NOTE — TELEPHONE ENCOUNTER
I spoke with the patient's son Gunner who is her caregiver.  Her daughter was also in town recently and they were looking at long-term care facilities and they've decided to place her probably at [facility] home.  Some telltale signs of worsening and potential hazards have developed such as leaving a pan on the stove when she has actually not cooked for several years.  In addition she did not recall that her mother spent 17 years in a memory care facility with Alzheimer's disease.  I believe their decision is a ruby one since she may be less accepting of a move as her dementia gets worse.    Dustin

## 2017-11-30 PROBLEM — I35.0 AORTIC VALVE STENOSIS: Status: ACTIVE | Noted: 2017-01-01

## 2017-12-01 NOTE — PROGRESS NOTES
Continued Stay Note  [facility]     Patient Name: Vanna Gardner  MRN: [id number]  Today's Date: 12/1/2017    Admit Date: 11/30/2017          Discharge Plan       12/01/17 1848    Case Management/Social Work Plan    Plan If pt ready for DC over the weekend, family/ caregivers will stay with her until a bed on [facility] dementia unit is available.     Additional Comments PT/ OT orders received.  Pt refused PT today.  Spoke with pt's son, Fabrice to discuss DC plans.   Discussed that pt might be ready for DC over the weekend - before a bed is available at [facility] dementia unit.  Fabrice states that his sister is here now; so, if pt is ready for DC over the weekend, they will make sure someone is with pt at all times until  the room on the dementia unit at [place] opens up.  Jeanmarie Concepcion is aware.               Discharge Codes     None            Sabi Herrmann RN

## 2017-12-01 NOTE — PROGRESS NOTES
Discharge Planning Assessment  [facility]     Patient Name: Vanna Gardner  MRN: [id number]  Today's Date: 12/1/2017    Admit Date: 11/30/2017          Discharge Needs Assessment       12/01/17 0835    Living Environment    Lives With alone    Living Arrangements house    Home Accessibility no concerns    Transportation Available family or friend will provide    Living Environment    Primary Care Provided By homecare agency (specify)    Quality Of Family Relationships supportive    Discharge Needs Assessment    Concerns To Be Addressed discharge planning concerns    Equipment Currently Used at Home cane, straight;rollator;shower chair    Discharge Planning Comments Spoke with pt's son, Fabrice at bedside.  Facesheet info confirmed.  Pt lives in a single story house and has pvt pay caregivers 6 hrs/ day, 7 days/ wk.  She ambulates with a rollator walker.  She has had HH in the past, and has been to [facility] for rehab.  Pt's son states he has been talking with [facility] about pt moving to their dementia unit.  He requests referral.  VM message left with Tonya/ [facility].            Discharge Plan       12/01/17 2940    Case Management/Social Work Plan    Plan Pt's son plans for pt to go to [place] [facility] unit upon DC.        Discharge Placement     Facility/Agency Request Status Selected? Address Phone Number Fax Number    [facility] Pending - Request Sent     [address] [address] [phone]        Sabi Herrmann RN 12/1/2017 08:40     to Tonya                           Demographic Summary       12/01/17 2786    Referral Information    Admission Type observation    Arrived From home or self-care    Referral Source admission list    Reason For Consult discharge planning    Record Reviewed medical record    Primary Care Physician Information    Name Ahmet Booth MD            Functional Status       12/01/17 0883    Functional Status  Current    Ambulation 3-->assistive equipment and person    Transferring 3-->assistive equipment and person    Toileting 3-->assistive equipment and person    Bathing 3-->assistive equipment and person    Dressing 3-->assistive equipment and person    Eating 0-->independent    Communication 0-->understands/communicates without difficulty    Swallowing (if score 2 or more for any item, consult Rehab Services) 0-->swallows foods/liquids without difficulty    Functional Status Prior    Ambulation 3-->assistive equipment and person    Transferring 3-->assistive equipment and person    Toileting 3-->assistive equipment and person    Bathing 3-->assistive equipment and person    Dressing 3-->assistive equipment and person    Eating 0-->independent    Communication 0-->understands/communicates without difficulty    Swallowing 0-->swallows foods/liquids without difficulty    IADL    Medications assistive person    Meal Preparation assistive person    Housekeeping assistive person    Laundry assistive person    Shopping assistive person    Oral Care assistive person     Sabi Herrmann RN

## 2017-12-01 NOTE — PLAN OF CARE
Problem: Patient Care Overview (Adult)  Goal: Plan of Care Review  Outcome: Ongoing (interventions implemented as appropriate)    12/01/17 0442   Coping/Psychosocial Response Interventions   Plan Of Care Reviewed With patient   Patient Care Overview   Progress no change   Outcome Evaluation   Outcome Summary/Follow up Plan Patient admitted from ER. Patients vitals stable. Patient denies pain and discomfort. Patient very anxious when arive to floor. Patient was refusing care and verbally abusive to staff. Son reported patient has dementia. Patient settled down and took xanax PRN to helrp her sleep. Patient ambulates with walker and has her own at bedside. Patient has no skin issues. Will continue to monitor.         Problem: Fall Risk (Adult)  Goal: Identify Related Risk Factors and Signs and Symptoms  Outcome: Ongoing (interventions implemented as appropriate)  Goal: Absence of Falls  Outcome: Ongoing (interventions implemented as appropriate)    Problem: Confusion, Acute (Adult)  Goal: Identify Related Risk Factors and Signs and Symptoms  Outcome: Ongoing (interventions implemented as appropriate)  Goal: Cognitive/Functional Impairments Minimized  Outcome: Ongoing (interventions implemented as appropriate)  Goal: Safety  Outcome: Ongoing (interventions implemented as appropriate)

## 2017-12-01 NOTE — H&P
History and physical    Primary care physician  Dr. Ahmet Booth    Chief complaint  Dizziness    History of present illness  82-year-old white female with history of hypertension dementia who smokes brought to the emergency room with episode of dizziness yesterday and she thought she is Passed out.  Patient stated that her head was swimming drinking but no headaches no nausea or chest pain.  Patient also denies any shortness of breath.  Patient evaluated in ER and admitted further workup.  Patient also denies any fever chills but she's been feeling weak.  No visual problems no weakness on either side no speech issues.    PAST MEDICAL HISTORY    • Dementia     • Hypertension           PAST SURGICAL HISTORY   Surgical History          Past Surgical History:   Procedure Laterality Date   • BLADDER SURGERY       • CATARACT EXTRACTION       • EYE SURGERY         eyelid    • HYSTERECTOMY       • SKIN GRAFT       • TOTAL HIP ARTHROPLASTY       • VAGINAL PROLAPSE REPAIR       • VOCAL CORD BIOPSY                FAMILY HISTORY  History reviewed. No pertinent family history.     SOCIAL HISTORY   Social History    Social History            Social History   • Marital status:        Spouse name: N/A   • Number of children: N/A   • Years of education: N/A          Occupational History   • Not on file.             Social History Main Topics   • Smoking status: Current Every Day Smoker       Types: Cigarettes   • Smokeless tobacco: Not on file   • Alcohol use Yes    • Drug use: No   • Sexual activity: Not on file           Other Topics Concern   • Not on file          Social History Narrative   • No narrative on file            ALLERGIES  Codeine; Hydrochlorothiazide; Latex; Nickel; and Penicillins     Home medications reviewed     REVIEW OF SYSTEMS  Review of Systems   Unable to perform ROS: Dementia   Constitutional: Positive for fever (subjective).   Respiratory: Positive for cough.    Neurological: Positive for  "dizziness and weakness (genearlized).      PHYSICAL EXAM  Blood pressure 166/72, pulse 64, temperature 98.2 °F (36.8 °C), temperature source Oral, resp. rate 16, height 63\" (160 cm), weight 149 lb 12.8 oz (67.9 kg), SpO2 92 %.    Constitutional: She is well-developed, well-nourished, and in no distress.   Head: Normocephalic and atraumatic.   Eyes: EOM are normal. Pupils are equal, round, and reactive to light.   Neck: Normal range of motion. No JVD present. Carotid bruit is not present.   Cardiovascular: Normal rate and regular rhythm.    Murmur heard.   Systolic murmur is present with a grade of 1/6   Systolic murmur heard best at right second intercostal space. Good distal pulses.    Pulmonary/Chest: Effort normal. No respiratory distress. She has rhonchi in the right lower field.   Abdominal: Soft. Bowel sounds are normal. There is no tenderness.   Neurological: She is alert. She has normal sensation, normal strength and intact cranial nerves. She displays no weakness and facial symmetry. No cranial nerve deficit. She has a normal Cerebellar Exam. GCS score is 15.   Skin: Skin is warm and dry.   No pitting edema BLE.    Psychiatric: Affect normal.     LAB RESULTS  Lab Results (last 24 hours)     Procedure Component Value Units Date/Time    Urinalysis With / Culture If Indicated - Urine, Catheter [[id number]]  (Abnormal) Collected:  11/30/17 1525    Specimen:  Urine from Urine, Clean Catch Updated:  11/30/17 1550     Color, UA Yellow     Appearance, UA Cloudy (A)     pH, UA 7.5     Specific Gravity, UA 1.010     Glucose, UA Negative     Ketones, UA Negative     Bilirubin, UA Negative     Blood, UA Negative     Protein, UA Negative     Leuk Esterase, UA Negative     Nitrite, UA Negative     Urobilinogen, UA 0.2 E.U./dL    Narrative:       Urine microscopic not indicated.    CBC & Differential [[id number]] Collected:  11/30/17 1534    Specimen:  Blood Updated:  11/30/17 1557    Narrative:       The following orders " were created for panel order CBC & Differential.  Procedure                               Abnormality         Status                     ---------                               -----------         ------                     CBC Auto Differential[[id number]]         Abnormal            Final result                 Please view results for these tests on the individual orders.    CBC Auto Differential [[id number]]  (Abnormal) Collected:  11/30/17 1534    Specimen:  Blood Updated:  11/30/17 1557     WBC 8.17 10*3/mm3      RBC 5.09 10*6/mm3      Hemoglobin 17.3 (H) g/dL      Hematocrit 51.4 (H) %      .0 (H) fL      MCH 34.0 (H) pg      MCHC 33.7 g/dL      RDW 15.0 (H) %      RDW-SD 55.3 (H) fl      MPV 11.1 fL      Platelets 172 10*3/mm3      Neutrophil % 59.4 %      Lymphocyte % 24.1 %      Monocyte % 13.7 (H) %      Eosinophil % 2.2 %      Basophil % 0.6 %      Immature Grans % 0.0 %      Neutrophils, Absolute 4.85 10*3/mm3      Lymphocytes, Absolute 1.97 10*3/mm3      Monocytes, Absolute 1.12 10*3/mm3      Eosinophils, Absolute 0.18 10*3/mm3      Basophils, Absolute 0.05 10*3/mm3      Immature Grans, Absolute 0.00 10*3/mm3     Comprehensive Metabolic Panel [[id number]]  (Abnormal) Collected:  11/30/17 1534    Specimen:  Blood Updated:  11/30/17 1621     Glucose 93 mg/dL      BUN 10 mg/dL      Creatinine 0.60 mg/dL      Sodium 141 mmol/L      Potassium 3.6 mmol/L       Specimen hemolyzed.  Results may be affected.        Chloride 101 mmol/L      CO2 30.1 (H) mmol/L      Calcium 9.1 mg/dL      Total Protein 6.6 g/dL      Albumin 3.70 g/dL      ALT (SGPT) 16 U/L       Specimen hemolyzed.  Results may be affected.        AST (SGOT) 29 U/L       Specimen hemolyzed.  Results may be affected.        Alkaline Phosphatase 74 U/L      Total Bilirubin 0.5 mg/dL      eGFR Non African Amer 96 mL/min/1.73      Globulin 2.9 gm/dL      A/G Ratio 1.3 g/dL      BUN/Creatinine Ratio 16.7     Anion Gap 9.9 mmol/L     Narrative:        The MDRD GFR formula is only valid for adults with stable renal function between ages 18 and 70.    Troponin [[id number]]  (Normal) Collected:  11/30/17 1534    Specimen:  Blood Updated:  11/30/17 1621     Troponin T <0.010 ng/mL       Specimen hemolyzed.  Results may be affected.       Narrative:       Troponin T Reference Ranges:  Less than 0.03 ng/mL:    Negative for AMI  0.03 to 0.09 ng/mL:      Indeterminant for AMI  Greater than 0.09 ng/mL: Positive for AMI    CBC & Differential [[id number]] Collected:  12/01/17 0555    Specimen:  Blood Updated:  12/01/17 0644    Narrative:       The following orders were created for panel order CBC & Differential.  Procedure                               Abnormality         Status                     ---------                               -----------         ------                     CBC Auto Differential[[id number]]        Abnormal            Final result                 Please view results for these tests on the individual orders.    CBC Auto Differential [[id number]]  (Abnormal) Collected:  12/01/17 0555    Specimen:  Blood Updated:  12/01/17 0644     WBC 8.27 10*3/mm3      RBC 4.75 10*6/mm3      Hemoglobin 15.9 (H) g/dL      Hematocrit 48.2 (H) %      .5 (H) fL      MCH 33.5 (H) pg      MCHC 33.0 g/dL      RDW 14.9 (H) %      RDW-SD 55.2 (H) fl      MPV 11.2 fL      Platelets 177 10*3/mm3      Neutrophil % 54.5 %      Lymphocyte % 29.3 %      Monocyte % 12.6 (H) %      Eosinophil % 2.8 %      Basophil % 0.8 %      Immature Grans % 0.0 %      Neutrophils, Absolute 4.51 10*3/mm3      Lymphocytes, Absolute 2.42 10*3/mm3      Monocytes, Absolute 1.04 10*3/mm3      Eosinophils, Absolute 0.23 10*3/mm3      Basophils, Absolute 0.07 10*3/mm3      Immature Grans, Absolute 0.00 10*3/mm3     Basic Metabolic Panel [[id number]]  (Abnormal) Collected:  12/01/17 0555    Specimen:  Blood Updated:  12/01/17 0752     Glucose 79 mg/dL      BUN 7 (L) mg/dL      Creatinine 0.51 (L) mg/dL       Sodium 143 mmol/L      Potassium 3.6 mmol/L      Chloride 104 mmol/L      CO2 28.9 mmol/L      Calcium 8.8 mg/dL      eGFR Non African Amer 115 mL/min/1.73      BUN/Creatinine Ratio 13.7     Anion Gap 10.1 mmol/L     Narrative:       The MDRD GFR formula is only valid for adults with stable renal function between ages 18 and 70.        Imaging Results (last 24 hours)     Procedure Component Value Units Date/Time    XR Chest 2 View [[id number]] Collected:  11/30/17 1643     Updated:  11/30/17 1652    Narrative:       PA AND LATERAL CHEST     HISTORY: Weakness and dizziness. History of hypertension.     COMPARISON: PA and lateral chest 11/20/2014.     FINDINGS: Heart size is enlarged. The thoracic aorta is tortuous. Aortic  vascular calcifications are noted. Chronic increased bronchovascular  markings are present without evidence for pulmonary edema or pleural  effusion or infiltrate. There is no evidence for significant change  compared to the previous examination. Lungs are hyperexpanded and there  is flattening of the hemidiaphragms. Bones appear osteopenic. Cardiac  monitor and leads are noted.       Impression:       Chronic changes within the chest without evidence for acute  disease. COPD.     This report was finalized on 11/30/2017 4:49 PM by Dr. Dandy Gardner MD.       CT Head Without Contrast [[id number]] Collected:  11/30/17 1629     Updated:  11/30/17 1734    Narrative:       CT SCAN OF THE HEAD WITHOUT CONTRAST     CLINICAL HISTORY: Dizziness and weakness. Vertigo.     TECHNIQUE: CT scan of the head was obtained with 3 mm axial images. No  intravenous contrast was administered.     FINDINGS:     There are mild/moderate changes of chronic small vessel ischemic  phenomena. The basal ganglia and thalami are unremarkable in appearance.  There is old lacunar disease involving the lentiform nuclei.  Atherosclerotic changes are identified within the intracranial vascular.  The posterior fossa structures are  otherwise within normal limits.       Impression:          No evidence for acute intracranial pathology. However, if there is  concern for a posterior fossa infarct, further evaluation could be  performed with MR imaging for more sensitive and specific evaluation.     These findings and recommendations were directly discussed with Oziel Son on 11/30/2017 at approximately 3:57 PM.     Radiation dose reduction techniques were utilized, including automated  exposure control and exposure modulation based on body size.     This report was finalized on 11/30/2017 5:31 PM by Dr. Linden Fleming MD.       MRI Brain With & Without Contrast [[id number]] Collected:  12/01/17 0818     Updated:  12/01/17 0832    Narrative:       MRI OF THE BRAIN WITH AND WITHOUT CONTRAST     HISTORY: Dementia. Episode of dizziness and fatigue yesterday while in  kitchen.     MRI of the brain was obtained with sagittal T1, axial T1, axial  postgadolinium fat saturated T1, axial flair, axial T2, axial diffusion,  axial gradient echo, coronal postgadolinium T1 weighted images.     FINDINGS:     There are no abnormal areas of restricted diffusion within the brain  parenchyma. The major intracranial flow-related signal voids are within  normal limits. The ventricles, sulci, and cisterns are age appropriate.  Old lacunar disease is identified within the lentiform nuclei.  Relatively mild changes of chronic small vessel ischemic phenomena are  noted. There are no abnormal areas of susceptibility artifact.     Incidentally noted is a sebaceous cyst just lateral to the left masseter  muscle, and this measures up to approximately 1.3 cm in diameter.       Impression:       1.  No evidence for acute intracranial pathology.  2.  Mild changes of chronic small vessel ischemic phenomena.  3.  Incidentally noted is a sebaceous cyst within the subcutaneous fat  of the soft tissues just lateral to the left masseter muscle.     This report was finalized on  12/1/2017 8:29 AM by Dr. Linden Fleming MD.              ECG 12 Lead            RR Interval= 822 ms  NM Interval= 156 ms  QRSD Interval= 110 ms  QT Interval= 392 ms  QTc Interval= 432 ms  Heart Rate= 73 ms  P Axis= 33 deg  QRS Axis= -26 deg  T Wave Axis= 73 deg  I: 40 Axis= -11 deg  T: 40 Axis= -31 deg  ST Axis= 108 deg  SINUS RHYTHM  NONSPECIFIC INTRAVENTRICULAR CONDUCTION DELAY  LEFT VENTRICULAR HYPERTROPHY  NO PRIOR TRACING AVAILABLE FOR COMPARISON                Current Facility-Administered Medications:   •  ALPRAZolam (XANAX) tablet 0.5 mg, 0.5 mg, Oral, 4x Daily PRN, Mook Oswald MD, 0.5 mg at 11/30/17 2207  •  [START ON 12/2/2017] amLODIPine (NORVASC) tablet 10 mg, 10 mg, Oral, Q24H, Mook Oswald MD  •  aspirin EC tablet 81 mg, 81 mg, Oral, Daily, Mook Oswald MD, 81 mg at 12/01/17 0959  •  cetirizine (zyrTEC) tablet 10 mg, 10 mg, Oral, Daily, Mook Oswald MD  •  ipratropium-albuterol (DUO-NEB) nebulizer solution 3 mL, 3 mL, Nebulization, Q4H - RT, Mook Oswald MD  •  memantine (NAMENDA) tablet 10 mg, 10 mg, Oral, BID, Mook Oswald MD  •  multivitamin with minerals 1 tablet, 1 tablet, Oral, Daily, Mook Oswald MD  •  nicotine (NICODERM CQ) 14 MG/24HR patch 1 patch, 1 patch, Transdermal, Q24H, Mook Oswald MD  •  pantoprazole (PROTONIX) EC tablet 40 mg, 40 mg, Oral, Q AM, Mook Oswald MD, 40 mg at 12/01/17 0959  •  Insert peripheral IV, , , Once **AND** sodium chloride 0.9 % flush 10 mL, 10 mL, Intravenous, PRN, Mike Son III, PA  •  sodium chloride 0.9 % with KCl 20 mEq/L infusion, 75 mL/hr, Intravenous, Continuous, Mook Oswald MD, Last Rate: 75 mL/hr at 12/01/17 0820, 75 mL/hr at 12/01/17 0820     ASSESSMENT  Dizziness  Near syncope  Hypertension  Dementia  Gastroesophageal disease  Anxiety disorder  COPD  Tobacco abuse    PLAN  Admit  Aspirin and Lipitor  Neuro and cardiac workup  IV fluid  Supportive care  PTOT  Stress ulcer DVT prophylaxis  Check 2-D echo  Discussed with son  Follow closely  further recommendation according to hospital course    CHING MAYES MD

## 2017-12-01 NOTE — DISCHARGE PLACEMENT REQUEST
"Vanna Gardner (82 y.o. Female)     Date of Birth Social Security Number Address Home Phone MRN    [id number]  [id number] [facility] [facility] [phone] [id number]    Islam Marital Status          Restorationism        Admission Date Admission Type Admitting Provider Attending Provider Department, Room/Bed    11/30/17 Emergency Mook Oswald MD Ahmed, Aftab, MD [address], [address]    Discharge Date Discharge Disposition Discharge Destination                      Attending Provider: Mook Oswald MD     Allergies:  Codeine, Hydrochlorothiazide, Latex, Nickel, Penicillins    Isolation:  None   Infection:  None   Code Status:  Not on file    Ht:  63\" (160 cm)   Wt:  149 lb 12.8 oz (67.9 kg)    Admission Cmt:  None   Principal Problem:  None                Active Insurance as of 11/30/2017     Primary Coverage     Payor Plan Insurance Group Employer/Plan Group    ANTH MEDICARE REPLACEMENT ANTH MEDICARE ADVANTAGE [id number]     Payor Plan Address Payor Plan Phone Number Effective From Effective To    [address] [phone] 4/1/2017     [place] [address]       Subscriber Name Subscriber Birth Date Member ID       VANNA GARDNER 1934 [id number]                 Emergency Contacts      (Rel.) Home Phone Work Phone Mobile Phone    Fabrice Gardner (Son) -- -- [phone]              "

## 2017-12-01 NOTE — PLAN OF CARE
Problem: Patient Care Overview (Adult)  Goal: Plan of Care Review    12/01/17 0920   Coping/Psychosocial Response Interventions   Plan Of Care Reviewed With patient;son       Goal: Discharge Needs Assessment  Outcome: Ongoing (interventions implemented as appropriate)    12/01/17 0920   Discharge Needs Assessment   Concerns To Be Addressed discharge planning concerns;cognitive/perceptual concerns   Current Discharge Risk cognitively impaired   Discharge Disposition still a patient   Living Environment   Transportation Available family or friend will provide         Problem: Fall Risk (Adult)  Goal: Identify Related Risk Factors and Signs and Symptoms  Outcome: Ongoing (interventions implemented as appropriate)    12/01/17 0920   Fall Risk   Fall Risk: Related Risk Factors age-related changes;bladder function altered;confusion/agitation;gait/mobility problems;history of falls;environment unfamiliar         12/01/17 0920   Fall Risk   Fall Risk: Related Risk Factors age-related changes;bladder function altered;confusion/agitation;gait/mobility problems;history of falls;environment unfamiliar   Fall Risk: Signs and Symptoms presence of risk factors       Goal: Absence of Falls  Outcome: Ongoing (interventions implemented as appropriate)    12/01/17 0920   Fall Risk (Adult)   Absence of Falls making progress toward outcome         Problem: Confusion, Acute (Adult)  Goal: Identify Related Risk Factors and Signs and Symptoms  Outcome: Ongoing (interventions implemented as appropriate)    12/01/17 0920   Confusion, Acute   Related Risk Factors (Acute Confusion) advanced age;cognitive impairment   Signs and Symptoms (Acute Confusion) disorientation       Goal: Cognitive/Functional Impairments Minimized  Outcome: Ongoing (interventions implemented as appropriate)    12/01/17 0920   Confusion, Acute (Adult)   Cognitive/Functional Impairments Minimized making progress toward outcome       Goal: Safety  Outcome: Ongoing  (interventions implemented as appropriate)    12/01/17 2211   Confusion, Acute (Adult)   Safety making progress toward outcome

## 2017-12-01 NOTE — SIGNIFICANT NOTE
12/01/17 1629   Rehab Treatment   Discipline physical therapist   Rehab Evaluation   Evaluation Not Performed patient/family declined evaluation  (pt adamantly refusing PT right now, even with encouragement from family. CCP aware )   Recommendation   PT - Next Appointment 12/02/17

## 2017-12-01 NOTE — SIGNIFICANT NOTE
12/01/17 1133   Rehab Treatment   Discipline speech language pathologist   Rehab Evaluation   Evaluation Not Performed other (see comments)  (Pt passed RN SS and is tolerating a regular diet. MRI negative for acute stroke. Speech therapy intervention not warranted at this time per RN, SLP in agreement. SLP to sign off. Please re consult as indicated. )   Recommendations   SLP - Next Appointment (Sign off. )

## 2017-12-01 NOTE — CONSULTS
"Date of Consultation: 17    Referral Provider: Mook Oswald MD     Reason for Consultation: Aortic stenosis, dizziness     Encounter Provider: David Dunn MD    Group of Service: [facility]     Patient Name: Vanna Gardner    :1934    Chief complaint: Dizziness    History of Present Illness: Ms. Vanna Faustin is an 82 year old female patient with a history of mild aortic stenosis (ECHO in ), HTN, heart murmur, dementia, and GERD. Patient is a current every day smoker. Patient had an ECHO on 2012 which revealed mild concentric left ventricular hypertrophy, mild aortic stenosis, mild aortic regurgitation, and mild tricuspid regurgitation with an EF of 60-65%. She is a former patient of [facility] last seen in .     The patient does have baseline dementia, although her son filled me in on the details.  Evidently, she was at home, when she began to complain that things were \"circling\".  This seemed to be more of an episode consistent with dizziness rather than true lightheadedness.  She did not pass out, and grabbed her walker prior to easing to the floor.  She did feel nauseated during this time.  The patient denied any recent chest discomfort.  Her troponin was negative.  Her EKG shows normal sinus rhythm with LVH, but no other acute changes.  Her neurological workup, including MRI of the brain, has been negative thus far.      Previous Testing:  ECHO 2012      Past Medical History:   Diagnosis Date   • Dementia    • Hypertension          Past Surgical History:   Procedure Laterality Date   • BLADDER SURGERY     • CATARACT EXTRACTION     • EYE SURGERY      eyelid    • HYSTERECTOMY     • SKIN GRAFT     • TOTAL HIP ARTHROPLASTY     • VAGINAL PROLAPSE REPAIR     • VOCAL CORD BIOPSY           Allergies   Allergen Reactions   • Codeine      rash   • Hydrochlorothiazide    • Latex    • Nickel    • Penicillins          No current facility-administered medications on file " "prior to encounter.      Current Outpatient Prescriptions on File Prior to Encounter   Medication Sig Dispense Refill   • amLODIPine (NORVASC) 5 MG tablet take 1 tablet by mouth once daily  0   • FEXOFENADINE HCL PO Take 180 tablets by mouth Daily.     • memantine (NAMENDA) 10 MG tablet Take 10 mg by mouth 2 (Two) Times a Day.  0   • Multiple Vitamins-Minerals (MULTIVITAMIN PO) Take  by mouth.     • [DISCONTINUED] furosemide (LASIX) 40 MG tablet Take 20 mg by mouth Daily.  0   • [DISCONTINUED] Multiple Vitamins-Minerals (OCUVITE ADULT 50+ PO) Take  by mouth.     • [DISCONTINUED] RA ALLERGY RELIEF 180 MG tablet   0         Social History     Social History   • Marital status:      Spouse name: N/A   • Number of children: N/A   • Years of education: N/A     Occupational History   • Not on file.     Social History Main Topics   • Smoking status: Current Every Day Smoker     Types: Cigarettes   • Smokeless tobacco: Not on file   • Alcohol use Yes   • Drug use: No   • Sexual activity: Not on file     Other Topics Concern   • Not on file     Social History Narrative   • No narrative on file         History reviewed. No pertinent family history.      REVIEW OF SYSTEMS:   Pertinent positives were identified in history of present illness above.  Otherwise, all other systems were reviewed, and are negative.     Objective:     Vitals:    12/01/17 0539 12/01/17 0700 12/01/17 0806 12/01/17 1259   BP:  166/72 166/72 142/67   BP Location:  Left arm  Left arm   Patient Position:  Lying  Lying   Pulse:  64  67   Resp:  16  16   Temp:  98.2 °F (36.8 °C)  98.7 °F (37.1 °C)   TempSrc:  Oral  Oral   SpO2:  93% 92% 94%   Weight: 149 lb 12.8 oz (67.9 kg)      Height:         Body mass index is 26.54 kg/(m^2).  Flowsheet Rows         First Filed Value    Admission Height  62\" (157.5 cm) Documented at 11/30/2017 1446    Admission Weight  155 lb (70.3 kg) Documented at 11/30/2017 1446           General:    No acute distress, alert and " oriented, pleasant                   Head:    Normocephalic, atraumatic.   Eyes:          Conjunctivae and sclerae normal, no icterus, PERRLA   Throat:   No oral lesions, no thrush, oral mucosa moist.    Neck:   Supple, trachea midline.   Lungs:     Clear to auscultation bilaterally     Heart:    Regular rhythm and normal rate.  III/VI RENZO RUSB, LUSB   Abdomen:     Soft, non-tender, non-distended, positive bowel sounds.    Extremities:   No clubbing, cyanosis, or edema.     Pulses:   Pulses palpable and equal bilaterally.    Skin:   No bleeding or rash.   Neuro:   Non-focal.  Moves all extremities well.    Psychiatric:   Normal mood and affect.     Lab Review:                  Results from last 7 days  Lab Units 12/01/17  0555   SODIUM mmol/L 143   POTASSIUM mmol/L 3.6   CHLORIDE mmol/L 104   CO2 mmol/L 28.9   BUN mg/dL 7*   CREATININE mg/dL 0.51*   GLUCOSE mg/dL 79   CALCIUM mg/dL 8.8       Results from last 7 days  Lab Units 11/30/17  1534   TROPONIN T ng/mL <0.010       Results from last 7 days  Lab Units 12/01/17  0555   WBC 10*3/mm3 8.27   HEMOGLOBIN g/dL 15.9*   HEMATOCRIT % 48.2*   PLATELETS 10*3/mm3 177                       EKG (reviewed by me personally):  11/30/2017      Assessment:   1. Dizziness   2. Aortic stenosis  3. Dementia   4. Hypertension     Plan:       I had a long discussion with the patient and her son.  She does have some baseline dementia, although she is very pleasant and alert.  Her episode sounds much more consistent with vertigo or another cause for dizziness.  Her symptoms have completely resolved at this point.  Her MRI of the brain did not show any evidence of infarcts.  She does have a history of mild aortic stenosis, and she does have an aortic stenosis murmur on exam.  However, I highly doubt that this has progressed significantly or to the point where would be causing issues.  Additionally, I do not feel that the dizziness episode would be associated with this.  However, I have  ordered an echocardiogram to reassess her aortic valve in the degree of aortic stenosis.  If this looks reasonable, I do not feel that further cardiac workup is needed currently.  Her rhythm has been stable.  If she had recurrent episodes or any lightheaded episodes at home, long-term monitoring with a Zio Patch could be considered.    Thank you very much this consult.    Javier Dunn M.D.

## 2017-12-02 NOTE — PROGRESS NOTES
"Daily progress note    Chief complaint  Doing better  Full he is alert and oriented  No complaint whatsoever  Denies chest pain shortness of breath palpitation or dizziness  Family at bedside    History of present illness  82-year-old white female with history of hypertension dementia who smokes brought to the emergency room with episode of dizziness yesterday and she thought she is Passed out.  Patient stated that her head was swimming drinking but no headaches no nausea or chest pain.  Patient also denies any shortness of breath.  Patient evaluated in ER and admitted further workup.  Patient also denies any fever chills but she's been feeling weak.  No visual problems no weakness on either side no speech issues.     REVIEW OF SYSTEMS  Review of Systems   Unable to perform ROS: Dementia   Constitutional: Positive for fever (subjective).   Respiratory: Positive for cough.    Neurological: Positive for dizziness and weakness (genearlized).      PHYSICAL EXAM  Blood pressure 156/74, pulse 77, temperature 98.3 °F (36.8 °C), temperature source Oral, resp. rate 18, height 63\" (160 cm), weight 153 lb 8 oz (69.6 kg), SpO2 97 %.    Constitutional: She is well-developed, well-nourished, and in no distress.   Head: Normocephalic and atraumatic.   Eyes: EOM are normal. Pupils are equal, round, and reactive to light.   Neck: Normal range of motion. No JVD present. Carotid bruit is not present.   Cardiovascular: Normal rate and regular rhythm.    Murmur heard.   Systolic murmur is present with a grade of 1/6   Systolic murmur heard best at right second intercostal space. Good distal pulses.    Pulmonary/Chest: Effort normal. No respiratory distress. She has rhonchi in the right lower field.   Abdominal: Soft. Bowel sounds are normal. There is no tenderness.   Neurological: She is alert. She has normal sensation, normal strength and intact cranial nerves. She displays no weakness and facial symmetry. No cranial nerve deficit. She " has a normal Cerebellar Exam. GCS score is 15.   Skin: Skin is warm and dry.   No pitting edema BLE.    Psychiatric: Affect normal.     LAB RESULTS  Lab Results (last 24 hours)     Procedure Component Value Units Date/Time    CBC & Differential [[id number]] Collected:  12/02/17 0614    Specimen:  Blood Updated:  12/02/17 0703    Narrative:       The following orders were created for panel order CBC & Differential.  Procedure                               Abnormality         Status                     ---------                               -----------         ------                     CBC Auto Differential[[id number]]        Abnormal            Final result                 Please view results for these tests on the individual orders.    CBC Auto Differential [[id number]]  (Abnormal) Collected:  12/02/17 0614    Specimen:  Blood Updated:  12/02/17 0703     WBC 9.24 10*3/mm3      RBC 4.50 10*6/mm3      Hemoglobin 14.6 g/dL      Hematocrit 46.1 (H) %      .4 (H) fL      MCH 32.4 (H) pg      MCHC 31.7 (L) g/dL      RDW 15.1 (H) %      RDW-SD 56.2 (H) fl      MPV 11.1 fL      Platelets 157 10*3/mm3      Neutrophil % 58.7 %      Lymphocyte % 28.4 %      Monocyte % 10.9 %      Eosinophil % 1.5 %      Basophil % 0.3 %      Immature Grans % 0.2 %      Neutrophils, Absolute 5.42 10*3/mm3      Lymphocytes, Absolute 2.62 10*3/mm3      Monocytes, Absolute 1.01 10*3/mm3      Eosinophils, Absolute 0.14 10*3/mm3      Basophils, Absolute 0.03 10*3/mm3      Immature Grans, Absolute 0.02 10*3/mm3     Hemoglobin A1c [[id number]]  (Abnormal) Collected:  12/02/17 0614    Specimen:  Blood Updated:  12/02/17 0708     Hemoglobin A1C 4.61 (L) %     Narrative:       Hemoglobin A1C Ranges:    Increased Risk for Diabetes  5.7% to 6.4%  Diabetes                     >= 6.5%  Diabetic Goal                < 7.0%    Comprehensive Metabolic Panel [[id number]]  (Abnormal) Collected:  12/02/17 0614    Specimen:  Blood Updated:  12/02/17 0724      Glucose 88 mg/dL      BUN 10 mg/dL      Creatinine 0.63 mg/dL      Sodium 146 (H) mmol/L      Potassium 3.6 mmol/L      Chloride 109 (H) mmol/L      CO2 26.3 mmol/L      Calcium 8.5 (L) mg/dL      Total Protein 5.8 (L) g/dL      Albumin 3.30 (L) g/dL      ALT (SGPT) 12 U/L      AST (SGOT) 22 U/L      Alkaline Phosphatase 61 U/L      Total Bilirubin 0.5 mg/dL      eGFR Non African Amer 90 mL/min/1.73      Globulin 2.5 gm/dL      A/G Ratio 1.3 g/dL      BUN/Creatinine Ratio 15.9     Anion Gap 10.7 mmol/L     Narrative:       The MDRD GFR formula is only valid for adults with stable renal function between ages 18 and 70.    Lipid Panel [[id number]]  (Abnormal) Collected:  12/02/17 0614    Specimen:  Blood Updated:  12/02/17 0724     Total Cholesterol 160 mg/dL      Triglycerides 60 mg/dL      HDL Cholesterol 69 (H) mg/dL      LDL Cholesterol  79 mg/dL      VLDL Cholesterol 12 mg/dL      LDL/HDL Ratio 1.14    Narrative:       Cholesterol Reference Ranges  (U.S. Department of Health and Human Services ATP III Classifications)    Desirable          <200 mg/dL  Borderline High    200-239 mg/dL  High Risk          >240 mg/dL      Triglyceride Reference Ranges  (U.S. Department of Health and Human Services ATP III Classifications)    Normal           <150 mg/dL  Borderline High  150-199 mg/dL  High             200-499 mg/dL  Very High        >500 mg/dL    HDL Reference Ranges  (U.S. Department of Health and Human Services ATP III Classifcations)    Low     <40 mg/dl (major risk factor for CHD)  High    >60 mg/dl ('negative' risk factor for CHD)        LDL Reference Ranges  (U.S. Department of Health and Human Services ATP III Classifcations)    Optimal          <100 mg/dL  Near Optimal     100-129 mg/dL  Borderline High  130-159 mg/dL  High             160-189 mg/dL  Very High        >189 mg/dL    BNP [[id number]]  (Normal) Collected:  12/02/17 0614    Specimen:  Blood Updated:  12/02/17 0734     proBNP 582.5 pg/mL      Narrative:       Among patients with dyspnea, NT-proBNP is highly sensitive for the detection of acute congestive heart failure. In addition NT-proBNP of <300 pg/ml effectively rules out acute congestive heart failure with 99% negative predictive value.    TSH [[id number]]  (Normal) Collected:  12/02/17 0614    Specimen:  Blood Updated:  12/02/17 0734     TSH 2.830 mIU/mL         Imaging Results (last 24 hours)     ** No results found for the last 24 hours. **           ECG 12 Lead            RR Interval= 822 ms  WV Interval= 156 ms  QRSD Interval= 110 ms  QT Interval= 392 ms  QTc Interval= 432 ms  Heart Rate= 73 ms  P Axis= 33 deg  QRS Axis= -26 deg  T Wave Axis= 73 deg  I: 40 Axis= -11 deg  T: 40 Axis= -31 deg  ST Axis= 108 deg  SINUS RHYTHM  NONSPECIFIC INTRAVENTRICULAR CONDUCTION DELAY  LEFT VENTRICULAR HYPERTROPHY  NO PRIOR TRACING AVAILABLE FOR COMPARISON                Current Facility-Administered Medications:   •  ALPRAZolam (XANAX) tablet 0.5 mg, 0.5 mg, Oral, 4x Daily PRN, Mook Oswald MD, 0.5 mg at 12/01/17 2017  •  amLODIPine (NORVASC) tablet 10 mg, 10 mg, Oral, Q24H, Mook Oswald MD, 10 mg at 12/02/17 0859  •  aspirin EC tablet 81 mg, 81 mg, Oral, Daily, Mook Oswald MD, 81 mg at 12/02/17 0859  •  atorvastatin (LIPITOR) tablet 40 mg, 40 mg, Oral, Nightly, Mook Oswald MD, 40 mg at 12/01/17 2017  •  cetirizine (zyrTEC) tablet 10 mg, 10 mg, Oral, Daily, Mook Oswald MD, 10 mg at 12/02/17 0859  •  ipratropium-albuterol (DUO-NEB) nebulizer solution 3 mL, 3 mL, Nebulization, Q4H - RT, Mook Oswald MD, 3 mL at 12/02/17 1113  •  memantine (NAMENDA) tablet 10 mg, 10 mg, Oral, BID, Mook Oswald MD, 10 mg at 12/02/17 0900  •  multivitamin with minerals 1 tablet, 1 tablet, Oral, Daily, Mook Oswald MD, 1 tablet at 12/02/17 0859  •  nicotine (NICODERM CQ) 14 MG/24HR patch 1 patch, 1 patch, Transdermal, Q24H, Mookleia Oswald MD, 1 patch at 12/01/17 1454  •  pantoprazole (PROTONIX) EC tablet 40 mg, 40 mg, Oral, Q AM,  Ching Oswald MD, 40 mg at 12/02/17 0624  •  Polyethyl Glycol-Propyl Glycol 0.4-0.3 % gel 1 drop, 1 drop, Both Eyes, Q2H PRN, Ching Oswald MD  •  Insert peripheral IV, , , Once **AND** sodium chloride 0.9 % flush 10 mL, 10 mL, Intravenous, PRN, Mike Son III, PA  •  sodium chloride 0.9 % with KCl 20 mEq/L infusion, 75 mL/hr, Intravenous, Continuous, Ching Oswald MD, Last Rate: 75 mL/hr at 12/02/17 0620, 75 mL/hr at 12/02/17 0620     ASSESSMENT  Dizziness resolved  Moderate aortic stenosis  Near syncope resolved  Possible vertigo  High MCV with normal hemoglobin  Hypertension  Dementia  Gastroesophageal disease  Anxiety disorder  COPD  Tobacco abuse    PLAN  Discharge home  Discharge summary dictated    CHING OSWALD MD

## 2017-12-02 NOTE — PROGRESS NOTES
LOS: 0 days   Patient Care Team:  Ahmet Booth MD as PCP - General  Ahmet Booth MD as PCP - Family Medicine    Chief Complaint: Follow-up dizziness, aortic stenosis.    Interval History: No acute events.  Patient wants to go home.  No new complaints of dizziness or lightheadedness.  No CP or SOA.      Vital Signs:  Temp:  [97.8 °F (36.6 °C)-98.7 °F (37.1 °C)] 98.3 °F (36.8 °C)  Heart Rate:  [67-81] 77  Resp:  [10-18] 18  BP: (113-156)/(52-74) 156/74    Intake/Output Summary (Last 24 hours) at 12/02/17 0934  Last data filed at 12/02/17 0258   Gross per 24 hour   Intake             2139 ml   Output                0 ml   Net             2139 ml       Physical Exam:   General Appearance:    No acute distress, alert and oriented x4   Lungs:     Clear to auscultation bilaterally     Heart:    Regular rhythm and normal rate.  III/VI RENZO RUSB, LUSB   Abdomen:     Soft, non-tender, non-distended.    Extremities:   Moves all extremities well.  No clubbing, cyanosis, or edema.     Results Review:      Results from last 7 days  Lab Units 12/02/17  0614   SODIUM mmol/L 146*   POTASSIUM mmol/L 3.6   CHLORIDE mmol/L 109*   CO2 mmol/L 26.3   BUN mg/dL 10   CREATININE mg/dL 0.63   GLUCOSE mg/dL 88   CALCIUM mg/dL 8.5*       Results from last 7 days  Lab Units 11/30/17  1534   TROPONIN T ng/mL <0.010       Results from last 7 days  Lab Units 12/02/17  0614   WBC 10*3/mm3 9.24   HEMOGLOBIN g/dL 14.6   HEMATOCRIT % 46.1*   PLATELETS 10*3/mm3 157           Results from last 7 days  Lab Units 12/02/17  0614   CHOLESTEROL mg/dL 160           Results from last 7 days  Lab Units 12/02/17  0614   CHOLESTEROL mg/dL 160   TRIGLYCERIDES mg/dL 60   HDL CHOL mg/dL 69*       I reviewed the patient's new clinical results.        Assessment:  1. Dizziness   2. Aortic stenosis  3. Dementia   4. Hypertension     Plan:  -Echo with moderate AS - not causing any issues currently  -Telemetry reviewed - no events noted.  -Suspect vertigo  episode based on symptoms.  -No further cardiac work-up needed.  Ok to discharge per Cards. If recurrent dizziness, could consider longer term monitor in the future (possibly a Zio Patch).    Thanks      David Dunn MD  12/02/17  9:34 AM

## 2017-12-02 NOTE — DISCHARGE SUMMARY
Discharge summary    Date of admission 11/30/2017  Date of discharge 12/2/2017    Final diagnosis  Dizziness resolved  Moderate aortic stenosis  Near syncope resolved  Possible vertigo  High MCV with normal hemoglobin  Hypertension  Dementia  Gastroesophageal disease  Anxiety disorder  COPD  Tobacco abuse    Discharge medications    Current Facility-Administered Medications:   •  amLODIPine (NORVASC) tablet 10 mg, 10 mg, Oral, Q24H, Mook Oswald MD, 10 mg at 12/02/17 0859  •  aspirin EC tablet 81 mg, 81 mg, Oral, Daily, Mook Oswald MD, 81 mg at 12/02/17 0859  •  atorvastatin (LIPITOR) tablet 10 mg, 10 mg, Oral, Nightly, Mook Oswald MD  •  memantine (NAMENDA) tablet 10 mg, 10 mg, Oral, BID, Mook Oswald MD, 10 mg at 12/02/17 0900  •  multivitamin with minerals 1 tablet, 1 tablet, Oral, Daily, Mook Oswald MD, 1 tablet at 12/02/17 0859  •  pantoprazole (PROTONIX) EC tablet 40 mg, 40 mg, Oral, Q AM, Mook Oswald MD, 40 mg at 12/02/17 0624  •  Insert peripheral IV, , , Once **AND** sodium chloride 0.9 % flush 10 mL, 10 mL, Intravenous, PRN, CHANA Ernst III     Consult obtained  Neurology  Cardiology    Procedures  None    Hospital course  82-year-old white female with history of hypertension dementia admitted through emergency room with dizziness almost passed out.  Patient has also history of aortic stenosis admitted for management.  Patient admitted her neuro and cardiac workup is essentially negative.  Her blood pressure stabilized by adjusting the medication.  Patient started on baby aspirin.  Patient symptoms completely resolved.  Patient cleared with cardiology to be discharged.  Patient needs a long-term heart monitoring and she has recurrent symptoms.  At this time is probably vertigo or over use of nicotine as she continued to smoke and there are nicotine patch.  Patient home medication adjusted and I have discussed with family about the medication she needs to be on.  Patient also have high  MCV with and normal hemoglobin which needs to be follow-up with primary care doctor.    Discharge diet regular    Activity as tolerated    Medication as above    Follow-up with primary doctor in 1 week and follow with cardiology per their instruction and take medications directed    CHING MAYES MD

## 2017-12-02 NOTE — PLAN OF CARE
Problem: Patient Care Overview (Adult)  Goal: Plan of Care Review  Outcome: Ongoing (interventions implemented as appropriate)    12/02/17 2332   Coping/Psychosocial Response Interventions   Plan Of Care Reviewed With patient   Patient Care Overview   Progress no change   Outcome Evaluation   Outcome Summary/Follow up Plan Patients vitals stable. Patient denies pain and discomfort. Patient had some anxiety at beginning of shift PRN xanax given and patient calmed down and asleep most of shift. Will continue to monitor.         Problem: Fall Risk (Adult)  Goal: Identify Related Risk Factors and Signs and Symptoms  Outcome: Outcome(s) achieved Date Met:  12/02/17  Goal: Absence of Falls  Outcome: Ongoing (interventions implemented as appropriate)    Problem: Confusion, Acute (Adult)  Goal: Identify Related Risk Factors and Signs and Symptoms  Outcome: Outcome(s) achieved Date Met:  12/02/17  Goal: Cognitive/Functional Impairments Minimized  Outcome: Ongoing (interventions implemented as appropriate)  Goal: Safety  Outcome: Ongoing (interventions implemented as appropriate)

## 2018-01-01 ENCOUNTER — CLINICAL SUPPORT (OUTPATIENT)
Dept: ORTHOPEDIC SURGERY | Facility: CLINIC | Age: 83
End: 2018-01-01

## 2018-01-01 ENCOUNTER — APPOINTMENT (OUTPATIENT)
Dept: GENERAL RADIOLOGY | Facility: HOSPITAL | Age: 83
End: 2018-01-01

## 2018-01-01 ENCOUNTER — ANESTHESIA (OUTPATIENT)
Dept: PERIOP | Facility: HOSPITAL | Age: 83
End: 2018-01-01

## 2018-01-01 ENCOUNTER — HOSPITAL ENCOUNTER (INPATIENT)
Facility: HOSPITAL | Age: 83
LOS: 4 days | Discharge: NURSING, REHAB OR LONG-TERM CARE | End: 2018-03-10
Attending: EMERGENCY MEDICINE | Admitting: INTERNAL MEDICINE

## 2018-01-01 ENCOUNTER — APPOINTMENT (OUTPATIENT)
Dept: CT IMAGING | Facility: HOSPITAL | Age: 83
End: 2018-01-01

## 2018-01-01 ENCOUNTER — ANESTHESIA EVENT (OUTPATIENT)
Dept: PERIOP | Facility: HOSPITAL | Age: 83
End: 2018-01-01

## 2018-01-01 VITALS
OXYGEN SATURATION: 92 % | DIASTOLIC BLOOD PRESSURE: 60 MMHG | BODY MASS INDEX: 23.32 KG/M2 | RESPIRATION RATE: 16 BRPM | HEART RATE: 70 BPM | TEMPERATURE: 98 F | HEIGHT: 65 IN | WEIGHT: 140 LBS | SYSTOLIC BLOOD PRESSURE: 129 MMHG

## 2018-01-01 VITALS — HEIGHT: 63 IN | TEMPERATURE: 98.2 F

## 2018-01-01 DIAGNOSIS — S72.002A CLOSED FRACTURE OF LEFT HIP, INITIAL ENCOUNTER (HCC): Primary | ICD-10-CM

## 2018-01-01 DIAGNOSIS — F03.C0 SEVERE DEMENTIA (HCC): ICD-10-CM

## 2018-01-01 DIAGNOSIS — M17.0 PRIMARY OSTEOARTHRITIS OF BOTH KNEES: Primary | ICD-10-CM

## 2018-01-01 DIAGNOSIS — R26.2 DIFFICULTY WALKING: ICD-10-CM

## 2018-01-01 DIAGNOSIS — S00.03XA CONTUSION OF SCALP, INITIAL ENCOUNTER: ICD-10-CM

## 2018-01-01 DIAGNOSIS — M17.10 PRIMARY LOCALIZED OSTEOARTHROSIS OF LOWER LEG, UNSPECIFIED LATERALITY: ICD-10-CM

## 2018-01-01 LAB
ABO GROUP BLD: NORMAL
ALBUMIN SERPL-MCNC: 3.2 G/DL (ref 3.5–5.2)
ALBUMIN SERPL-MCNC: 3.9 G/DL (ref 3.5–5.2)
ALBUMIN/GLOB SERPL: 1.2 G/DL
ALBUMIN/GLOB SERPL: 1.6 G/DL
ALP SERPL-CCNC: 67 U/L (ref 39–117)
ALP SERPL-CCNC: 73 U/L (ref 39–117)
ALT SERPL W P-5'-P-CCNC: 32 U/L (ref 1–33)
ALT SERPL W P-5'-P-CCNC: 34 U/L (ref 1–33)
ANION GAP SERPL CALCULATED.3IONS-SCNC: 10.3 MMOL/L
ANION GAP SERPL CALCULATED.3IONS-SCNC: 10.7 MMOL/L
ANION GAP SERPL CALCULATED.3IONS-SCNC: 10.9 MMOL/L
ANION GAP SERPL CALCULATED.3IONS-SCNC: 9.2 MMOL/L
AST SERPL-CCNC: 42 U/L (ref 1–32)
AST SERPL-CCNC: 43 U/L (ref 1–32)
BASOPHILS # BLD AUTO: 0.01 10*3/MM3 (ref 0–0.2)
BASOPHILS # BLD AUTO: 0.03 10*3/MM3 (ref 0–0.2)
BASOPHILS NFR BLD AUTO: 0.1 % (ref 0–1.5)
BASOPHILS NFR BLD AUTO: 0.3 % (ref 0–1.5)
BILIRUB SERPL-MCNC: 0.6 MG/DL (ref 0.1–1.2)
BILIRUB SERPL-MCNC: 0.7 MG/DL (ref 0.1–1.2)
BILIRUB UR QL STRIP: NEGATIVE
BLD GP AB SCN SERPL QL: NEGATIVE
BUN BLD-MCNC: 13 MG/DL (ref 8–23)
BUN BLD-MCNC: 14 MG/DL (ref 8–23)
BUN BLD-MCNC: 7 MG/DL (ref 8–23)
BUN BLD-MCNC: 8 MG/DL (ref 8–23)
BUN/CREAT SERPL: 15.6 (ref 7–25)
BUN/CREAT SERPL: 17 (ref 7–25)
BUN/CREAT SERPL: 23.2 (ref 7–25)
BUN/CREAT SERPL: 24.1 (ref 7–25)
CALCIUM SPEC-SCNC: 8.4 MG/DL (ref 8.6–10.5)
CALCIUM SPEC-SCNC: 8.6 MG/DL (ref 8.6–10.5)
CALCIUM SPEC-SCNC: 9 MG/DL (ref 8.6–10.5)
CALCIUM SPEC-SCNC: 9 MG/DL (ref 8.6–10.5)
CHLORIDE SERPL-SCNC: 103 MMOL/L (ref 98–107)
CHLORIDE SERPL-SCNC: 104 MMOL/L (ref 98–107)
CHLORIDE SERPL-SCNC: 105 MMOL/L (ref 98–107)
CHLORIDE SERPL-SCNC: 108 MMOL/L (ref 98–107)
CLARITY UR: CLEAR
CO2 SERPL-SCNC: 26.7 MMOL/L (ref 22–29)
CO2 SERPL-SCNC: 27.3 MMOL/L (ref 22–29)
CO2 SERPL-SCNC: 27.8 MMOL/L (ref 22–29)
CO2 SERPL-SCNC: 29.1 MMOL/L (ref 22–29)
COLOR UR: YELLOW
CREAT BLD-MCNC: 0.45 MG/DL (ref 0.57–1)
CREAT BLD-MCNC: 0.47 MG/DL (ref 0.57–1)
CREAT BLD-MCNC: 0.56 MG/DL (ref 0.57–1)
CREAT BLD-MCNC: 0.58 MG/DL (ref 0.57–1)
DEPRECATED RDW RBC AUTO: 46.4 FL (ref 37–54)
DEPRECATED RDW RBC AUTO: 48.5 FL (ref 37–54)
EOSINOPHIL # BLD AUTO: 0 10*3/MM3 (ref 0–0.7)
EOSINOPHIL # BLD AUTO: 0.06 10*3/MM3 (ref 0–0.7)
EOSINOPHIL NFR BLD AUTO: 0 % (ref 0.3–6.2)
EOSINOPHIL NFR BLD AUTO: 0.6 % (ref 0.3–6.2)
ERYTHROCYTE [DISTWIDTH] IN BLOOD BY AUTOMATED COUNT: 13.3 % (ref 11.7–13)
ERYTHROCYTE [DISTWIDTH] IN BLOOD BY AUTOMATED COUNT: 13.6 % (ref 11.7–13)
GFR SERPL CREATININE-BSD FRML MDRD: 103 ML/MIN/1.73
GFR SERPL CREATININE-BSD FRML MDRD: 127 ML/MIN/1.73
GFR SERPL CREATININE-BSD FRML MDRD: 133 ML/MIN/1.73
GFR SERPL CREATININE-BSD FRML MDRD: 99 ML/MIN/1.73
GLOBULIN UR ELPH-MCNC: 2.5 GM/DL
GLOBULIN UR ELPH-MCNC: 2.6 GM/DL
GLUCOSE BLD-MCNC: 100 MG/DL (ref 65–99)
GLUCOSE BLD-MCNC: 101 MG/DL (ref 65–99)
GLUCOSE BLD-MCNC: 136 MG/DL (ref 65–99)
GLUCOSE BLD-MCNC: 97 MG/DL (ref 65–99)
GLUCOSE UR STRIP-MCNC: NEGATIVE MG/DL
HCT VFR BLD AUTO: 34.3 % (ref 35.6–45.5)
HCT VFR BLD AUTO: 37.4 % (ref 35.6–45.5)
HCT VFR BLD AUTO: 41.3 % (ref 35.6–45.5)
HCT VFR BLD AUTO: 41.6 % (ref 35.6–45.5)
HCT VFR BLD AUTO: 41.9 % (ref 35.6–45.5)
HGB BLD-MCNC: 11.1 G/DL (ref 11.9–15.5)
HGB BLD-MCNC: 11.8 G/DL (ref 11.9–15.5)
HGB BLD-MCNC: 13 G/DL (ref 11.9–15.5)
HGB BLD-MCNC: 13.4 G/DL (ref 11.9–15.5)
HGB BLD-MCNC: 14 G/DL (ref 11.9–15.5)
HGB UR QL STRIP.AUTO: NEGATIVE
HOLD SPECIMEN: NORMAL
HOLD SPECIMEN: NORMAL
IMM GRANULOCYTES # BLD: 0 10*3/MM3 (ref 0–0.03)
IMM GRANULOCYTES # BLD: 0.03 10*3/MM3 (ref 0–0.03)
IMM GRANULOCYTES NFR BLD: 0 % (ref 0–0.5)
IMM GRANULOCYTES NFR BLD: 0.2 % (ref 0–0.5)
INR PPP: 1.02 (ref 0.9–1.1)
INR PPP: 1.1 (ref 0.9–1.1)
KETONES UR QL STRIP: ABNORMAL
LEUKOCYTE ESTERASE UR QL STRIP.AUTO: NEGATIVE
LYMPHOCYTES # BLD AUTO: 0.81 10*3/MM3 (ref 0.9–4.8)
LYMPHOCYTES # BLD AUTO: 1.63 10*3/MM3 (ref 0.9–4.8)
LYMPHOCYTES NFR BLD AUTO: 16.8 % (ref 19.6–45.3)
LYMPHOCYTES NFR BLD AUTO: 6.3 % (ref 19.6–45.3)
MAGNESIUM SERPL-MCNC: 1.7 MG/DL (ref 1.6–2.4)
MCH RBC QN AUTO: 31.5 PG (ref 26.9–32)
MCH RBC QN AUTO: 32.3 PG (ref 26.9–32)
MCHC RBC AUTO-ENTMCNC: 32 G/DL (ref 32.4–36.3)
MCHC RBC AUTO-ENTMCNC: 33.7 G/DL (ref 32.4–36.3)
MCV RBC AUTO: 95.9 FL (ref 80.5–98.2)
MCV RBC AUTO: 98.6 FL (ref 80.5–98.2)
MONOCYTES # BLD AUTO: 1.2 10*3/MM3 (ref 0.2–1.2)
MONOCYTES # BLD AUTO: 1.39 10*3/MM3 (ref 0.2–1.2)
MONOCYTES NFR BLD AUTO: 14.3 % (ref 5–12)
MONOCYTES NFR BLD AUTO: 9.3 % (ref 5–12)
NEUTROPHILS # BLD AUTO: 10.86 10*3/MM3 (ref 1.9–8.1)
NEUTROPHILS # BLD AUTO: 6.6 10*3/MM3 (ref 1.9–8.1)
NEUTROPHILS NFR BLD AUTO: 68 % (ref 42.7–76)
NEUTROPHILS NFR BLD AUTO: 84.1 % (ref 42.7–76)
NITRITE UR QL STRIP: NEGATIVE
PH UR STRIP.AUTO: 7 [PH] (ref 5–8)
PLATELET # BLD AUTO: 199 10*3/MM3 (ref 140–500)
PLATELET # BLD AUTO: 228 10*3/MM3 (ref 140–500)
PMV BLD AUTO: 10.3 FL (ref 6–12)
PMV BLD AUTO: 11.3 FL (ref 6–12)
POTASSIUM BLD-SCNC: 3.4 MMOL/L (ref 3.5–5.2)
POTASSIUM BLD-SCNC: 3.6 MMOL/L (ref 3.5–5.2)
POTASSIUM BLD-SCNC: 3.8 MMOL/L (ref 3.5–5.2)
POTASSIUM BLD-SCNC: 3.8 MMOL/L (ref 3.5–5.2)
POTASSIUM BLD-SCNC: 3.9 MMOL/L (ref 3.5–5.2)
PROT SERPL-MCNC: 5.8 G/DL (ref 6–8.5)
PROT SERPL-MCNC: 6.4 G/DL (ref 6–8.5)
PROT UR QL STRIP: NEGATIVE
PROTHROMBIN TIME: 13.2 SECONDS (ref 11.7–14.2)
PROTHROMBIN TIME: 14 SECONDS (ref 11.7–14.2)
RBC # BLD AUTO: 4.25 10*6/MM3 (ref 3.9–5.2)
RBC # BLD AUTO: 4.34 10*6/MM3 (ref 3.9–5.2)
RH BLD: POSITIVE
SODIUM BLD-SCNC: 141 MMOL/L (ref 136–145)
SODIUM BLD-SCNC: 142 MMOL/L (ref 136–145)
SODIUM BLD-SCNC: 143 MMOL/L (ref 136–145)
SODIUM BLD-SCNC: 146 MMOL/L (ref 136–145)
SP GR UR STRIP: 1.01 (ref 1–1.03)
UROBILINOGEN UR QL STRIP: ABNORMAL
WBC NRBC COR # BLD: 12.91 10*3/MM3 (ref 4.5–10.7)
WBC NRBC COR # BLD: 9.71 10*3/MM3 (ref 4.5–10.7)
WHOLE BLOOD HOLD SPECIMEN: NORMAL
WHOLE BLOOD HOLD SPECIMEN: NORMAL

## 2018-01-01 PROCEDURE — 99221 1ST HOSP IP/OBS SF/LOW 40: CPT | Performed by: NURSE PRACTITIONER

## 2018-01-01 PROCEDURE — 25010000003 POTASSIUM CHLORIDE 10 MEQ/100ML SOLUTION: Performed by: INTERNAL MEDICINE

## 2018-01-01 PROCEDURE — 25010000002 PHENYLEPHRINE PER 1 ML: Performed by: NURSE ANESTHETIST, CERTIFIED REGISTERED

## 2018-01-01 PROCEDURE — 72125 CT NECK SPINE W/O DYE: CPT

## 2018-01-01 PROCEDURE — C1713 ANCHOR/SCREW BN/BN,TIS/BN: HCPCS | Performed by: ORTHOPAEDIC SURGERY

## 2018-01-01 PROCEDURE — 99285 EMERGENCY DEPT VISIT HI MDM: CPT

## 2018-01-01 PROCEDURE — 85025 COMPLETE CBC W/AUTO DIFF WBC: CPT | Performed by: INTERNAL MEDICINE

## 2018-01-01 PROCEDURE — 25010000002 FENTANYL CITRATE (PF) 100 MCG/2ML SOLUTION: Performed by: NURSE ANESTHETIST, CERTIFIED REGISTERED

## 2018-01-01 PROCEDURE — 85014 HEMATOCRIT: CPT | Performed by: ORTHOPAEDIC SURGERY

## 2018-01-01 PROCEDURE — 99024 POSTOP FOLLOW-UP VISIT: CPT | Performed by: NURSE PRACTITIONER

## 2018-01-01 PROCEDURE — 84132 ASSAY OF SERUM POTASSIUM: CPT | Performed by: INTERNAL MEDICINE

## 2018-01-01 PROCEDURE — 25010000002 POTASSIUM CHLORIDE PER 2 MEQ OF POTASSIUM: Performed by: INTERNAL MEDICINE

## 2018-01-01 PROCEDURE — 73501 X-RAY EXAM HIP UNI 1 VIEW: CPT

## 2018-01-01 PROCEDURE — 80053 COMPREHEN METABOLIC PANEL: CPT | Performed by: EMERGENCY MEDICINE

## 2018-01-01 PROCEDURE — 25010000002 PROPOFOL 10 MG/ML EMULSION: Performed by: NURSE ANESTHETIST, CERTIFIED REGISTERED

## 2018-01-01 PROCEDURE — 80053 COMPREHEN METABOLIC PANEL: CPT | Performed by: INTERNAL MEDICINE

## 2018-01-01 PROCEDURE — 0QS636Z REPOSITION RIGHT UPPER FEMUR WITH INTRAMEDULLARY INTERNAL FIXATION DEVICE, PERCUTANEOUS APPROACH: ICD-10-PCS | Performed by: ORTHOPAEDIC SURGERY

## 2018-01-01 PROCEDURE — 97110 THERAPEUTIC EXERCISES: CPT

## 2018-01-01 PROCEDURE — 93005 ELECTROCARDIOGRAM TRACING: CPT | Performed by: EMERGENCY MEDICINE

## 2018-01-01 PROCEDURE — 25010000002 VANCOMYCIN PER 500 MG: Performed by: ORTHOPAEDIC SURGERY

## 2018-01-01 PROCEDURE — 99232 SBSQ HOSP IP/OBS MODERATE 35: CPT | Performed by: INTERNAL MEDICINE

## 2018-01-01 PROCEDURE — 70450 CT HEAD/BRAIN W/O DYE: CPT

## 2018-01-01 PROCEDURE — 80048 BASIC METABOLIC PNL TOTAL CA: CPT | Performed by: INTERNAL MEDICINE

## 2018-01-01 PROCEDURE — 71045 X-RAY EXAM CHEST 1 VIEW: CPT

## 2018-01-01 PROCEDURE — 90791 PSYCH DIAGNOSTIC EVALUATION: CPT | Performed by: SOCIAL WORKER

## 2018-01-01 PROCEDURE — 73502 X-RAY EXAM HIP UNI 2-3 VIEWS: CPT

## 2018-01-01 PROCEDURE — 86900 BLOOD TYPING SEROLOGIC ABO: CPT | Performed by: ORTHOPAEDIC SURGERY

## 2018-01-01 PROCEDURE — 85018 HEMOGLOBIN: CPT | Performed by: ORTHOPAEDIC SURGERY

## 2018-01-01 PROCEDURE — 20610 DRAIN/INJ JOINT/BURSA W/O US: CPT | Performed by: ORTHOPAEDIC SURGERY

## 2018-01-01 PROCEDURE — 25010000002 DEXAMETHASONE PER 1 MG: Performed by: NURSE ANESTHETIST, CERTIFIED REGISTERED

## 2018-01-01 PROCEDURE — 27245 TREAT THIGH FRACTURE: CPT | Performed by: ORTHOPAEDIC SURGERY

## 2018-01-01 PROCEDURE — 93010 ELECTROCARDIOGRAM REPORT: CPT | Performed by: INTERNAL MEDICINE

## 2018-01-01 PROCEDURE — 97161 PT EVAL LOW COMPLEX 20 MIN: CPT

## 2018-01-01 PROCEDURE — 85610 PROTHROMBIN TIME: CPT | Performed by: INTERNAL MEDICINE

## 2018-01-01 PROCEDURE — 86901 BLOOD TYPING SEROLOGIC RH(D): CPT | Performed by: ORTHOPAEDIC SURGERY

## 2018-01-01 PROCEDURE — 25010000002 ONDANSETRON PER 1 MG: Performed by: NURSE ANESTHETIST, CERTIFIED REGISTERED

## 2018-01-01 PROCEDURE — 81003 URINALYSIS AUTO W/O SCOPE: CPT | Performed by: EMERGENCY MEDICINE

## 2018-01-01 PROCEDURE — 93005 ELECTROCARDIOGRAM TRACING: CPT | Performed by: INTERNAL MEDICINE

## 2018-01-01 PROCEDURE — 25010000002 MORPHINE PER 10 MG: Performed by: EMERGENCY MEDICINE

## 2018-01-01 PROCEDURE — 85025 COMPLETE CBC W/AUTO DIFF WBC: CPT | Performed by: EMERGENCY MEDICINE

## 2018-01-01 PROCEDURE — 25010000002 HYDROMORPHONE PER 4 MG: Performed by: INTERNAL MEDICINE

## 2018-01-01 PROCEDURE — 83735 ASSAY OF MAGNESIUM: CPT | Performed by: INTERNAL MEDICINE

## 2018-01-01 PROCEDURE — 86850 RBC ANTIBODY SCREEN: CPT | Performed by: ORTHOPAEDIC SURGERY

## 2018-01-01 PROCEDURE — 76000 FLUOROSCOPY <1 HR PHYS/QHP: CPT

## 2018-01-01 PROCEDURE — 85610 PROTHROMBIN TIME: CPT | Performed by: EMERGENCY MEDICINE

## 2018-01-01 DEVICE — IMPLANTABLE DEVICE: Type: IMPLANTABLE DEVICE | Site: TROCHANTER | Status: FUNCTIONAL

## 2018-01-01 DEVICE — SCRW LK STRDRV TI 5X38M STRL: Type: IMPLANTABLE DEVICE | Site: TROCHANTER | Status: FUNCTIONAL

## 2018-01-01 DEVICE — SCRW CANN TFN ADV TI 10.35X90MM STRL: Type: IMPLANTABLE DEVICE | Site: TROCHANTER | Status: FUNCTIONAL

## 2018-01-01 RX ORDER — METHYLPREDNISOLONE ACETATE 80 MG/ML
80 INJECTION, SUSPENSION INTRA-ARTICULAR; INTRALESIONAL; INTRAMUSCULAR; SOFT TISSUE
Status: COMPLETED | OUTPATIENT
Start: 2018-01-01 | End: 2018-01-01

## 2018-01-01 RX ORDER — POTASSIUM CHLORIDE 750 MG/1
40 CAPSULE, EXTENDED RELEASE ORAL AS NEEDED
Status: DISCONTINUED | OUTPATIENT
Start: 2018-01-01 | End: 2018-01-01 | Stop reason: HOSPADM

## 2018-01-01 RX ORDER — CLONAZEPAM 0.5 MG/1
0.5 TABLET ORAL 2 TIMES DAILY PRN
Status: ON HOLD | COMMUNITY
End: 2018-01-01

## 2018-01-01 RX ORDER — SODIUM CHLORIDE 0.9 % (FLUSH) 0.9 %
1-10 SYRINGE (ML) INJECTION AS NEEDED
Status: DISCONTINUED | OUTPATIENT
Start: 2018-01-01 | End: 2018-01-01 | Stop reason: HOSPADM

## 2018-01-01 RX ORDER — MIRTAZAPINE 15 MG/1
15 TABLET, FILM COATED ORAL NIGHTLY
COMMUNITY

## 2018-01-01 RX ORDER — BUPIVACAINE HYDROCHLORIDE 5 MG/ML
4 INJECTION, SOLUTION EPIDURAL; INTRACAUDAL
Status: COMPLETED | OUTPATIENT
Start: 2018-01-01 | End: 2018-01-01

## 2018-01-01 RX ORDER — NALOXONE HCL 0.4 MG/ML
0.4 VIAL (ML) INJECTION
Status: DISCONTINUED | OUTPATIENT
Start: 2018-01-01 | End: 2018-01-01 | Stop reason: HOSPADM

## 2018-01-01 RX ORDER — ACETAMINOPHEN 650 MG/1
650 SUPPOSITORY RECTAL EVERY 4 HOURS PRN
Status: DISCONTINUED | OUTPATIENT
Start: 2018-01-01 | End: 2018-01-01 | Stop reason: HOSPADM

## 2018-01-01 RX ORDER — FAMOTIDINE 10 MG/ML
20 INJECTION, SOLUTION INTRAVENOUS ONCE
Status: COMPLETED | OUTPATIENT
Start: 2018-01-01 | End: 2018-01-01

## 2018-01-01 RX ORDER — ACETAMINOPHEN 160 MG/5ML
650 SOLUTION ORAL EVERY 4 HOURS PRN
Status: DISCONTINUED | OUTPATIENT
Start: 2018-01-01 | End: 2018-01-01 | Stop reason: HOSPADM

## 2018-01-01 RX ORDER — ACETAMINOPHEN 325 MG/1
650 TABLET ORAL EVERY 4 HOURS PRN
Status: DISCONTINUED | OUTPATIENT
Start: 2018-01-01 | End: 2018-01-01 | Stop reason: HOSPADM

## 2018-01-01 RX ORDER — OLANZAPINE 5 MG/1
5 TABLET, ORALLY DISINTEGRATING ORAL NIGHTLY PRN
Status: DISCONTINUED | OUTPATIENT
Start: 2018-01-01 | End: 2018-01-01

## 2018-01-01 RX ORDER — CLONAZEPAM 0.5 MG/1
0.5 TABLET ORAL 2 TIMES DAILY PRN
Status: DISCONTINUED | OUTPATIENT
Start: 2018-01-01 | End: 2018-01-01

## 2018-01-01 RX ORDER — FLUMAZENIL 0.1 MG/ML
0.2 INJECTION INTRAVENOUS AS NEEDED
Status: DISCONTINUED | OUTPATIENT
Start: 2018-01-01 | End: 2018-01-01 | Stop reason: HOSPADM

## 2018-01-01 RX ORDER — LORAZEPAM 0.5 MG/1
0.5 TABLET ORAL EVERY 6 HOURS PRN
Status: DISCONTINUED | OUTPATIENT
Start: 2018-01-01 | End: 2018-01-01

## 2018-01-01 RX ORDER — ASPIRIN 81 MG/1
81 TABLET ORAL 2 TIMES DAILY
Status: DISCONTINUED | OUTPATIENT
Start: 2018-01-01 | End: 2018-01-01 | Stop reason: HOSPADM

## 2018-01-01 RX ORDER — ONDANSETRON 4 MG/1
4 TABLET, ORALLY DISINTEGRATING ORAL EVERY 6 HOURS PRN
Status: DISCONTINUED | OUTPATIENT
Start: 2018-01-01 | End: 2018-01-01 | Stop reason: HOSPADM

## 2018-01-01 RX ORDER — MAGNESIUM HYDROXIDE 1200 MG/15ML
LIQUID ORAL AS NEEDED
Status: DISCONTINUED | OUTPATIENT
Start: 2018-01-01 | End: 2018-01-01 | Stop reason: HOSPADM

## 2018-01-01 RX ORDER — ATORVASTATIN CALCIUM 10 MG/1
10 TABLET, FILM COATED ORAL NIGHTLY
Status: DISCONTINUED | OUTPATIENT
Start: 2018-01-01 | End: 2018-01-01 | Stop reason: HOSPADM

## 2018-01-01 RX ORDER — BUPIVACAINE HYDROCHLORIDE AND EPINEPHRINE 5; 5 MG/ML; UG/ML
INJECTION, SOLUTION EPIDURAL; INTRACAUDAL; PERINEURAL AS NEEDED
Status: DISCONTINUED | OUTPATIENT
Start: 2018-01-01 | End: 2018-01-01 | Stop reason: HOSPADM

## 2018-01-01 RX ORDER — MV-MN/OM3/DHA/EPA/FISH/LUT/ZEA 250-5-1 MG
250 CAPSULE ORAL DAILY
COMMUNITY

## 2018-01-01 RX ORDER — ONDANSETRON 2 MG/ML
4 INJECTION INTRAMUSCULAR; INTRAVENOUS EVERY 6 HOURS PRN
Status: DISCONTINUED | OUTPATIENT
Start: 2018-01-01 | End: 2018-01-01 | Stop reason: HOSPADM

## 2018-01-01 RX ORDER — HALOPERIDOL 5 MG/ML
0.5 INJECTION INTRAMUSCULAR EVERY 6 HOURS PRN
Status: CANCELLED | OUTPATIENT
Start: 2018-01-01

## 2018-01-01 RX ORDER — PROPOFOL 10 MG/ML
VIAL (ML) INTRAVENOUS AS NEEDED
Status: DISCONTINUED | OUTPATIENT
Start: 2018-01-01 | End: 2018-01-01 | Stop reason: SURG

## 2018-01-01 RX ORDER — LIDOCAINE HYDROCHLORIDE 10 MG/ML
0.5 INJECTION, SOLUTION EPIDURAL; INFILTRATION; INTRACAUDAL; PERINEURAL ONCE AS NEEDED
Status: DISCONTINUED | OUTPATIENT
Start: 2018-01-01 | End: 2018-01-01 | Stop reason: HOSPADM

## 2018-01-01 RX ORDER — ONDANSETRON 2 MG/ML
INJECTION INTRAMUSCULAR; INTRAVENOUS AS NEEDED
Status: DISCONTINUED | OUTPATIENT
Start: 2018-01-01 | End: 2018-01-01 | Stop reason: SURG

## 2018-01-01 RX ORDER — FENTANYL CITRATE 50 UG/ML
25 INJECTION, SOLUTION INTRAMUSCULAR; INTRAVENOUS
Status: DISCONTINUED | OUTPATIENT
Start: 2018-01-01 | End: 2018-01-01 | Stop reason: HOSPADM

## 2018-01-01 RX ORDER — LIDOCAINE HYDROCHLORIDE 20 MG/ML
INJECTION, SOLUTION INFILTRATION; PERINEURAL AS NEEDED
Status: DISCONTINUED | OUTPATIENT
Start: 2018-01-01 | End: 2018-01-01 | Stop reason: SURG

## 2018-01-01 RX ORDER — SODIUM CHLORIDE 9 MG/ML
50 INJECTION, SOLUTION INTRAVENOUS CONTINUOUS
Status: DISCONTINUED | OUTPATIENT
Start: 2018-01-01 | End: 2018-01-01

## 2018-01-01 RX ORDER — PSEUDOEPHEDRINE HCL 30 MG
100 TABLET ORAL 2 TIMES DAILY
Start: 2018-01-01 | End: 2018-01-01

## 2018-01-01 RX ORDER — ONDANSETRON 4 MG/1
4 TABLET, FILM COATED ORAL EVERY 6 HOURS PRN
Status: DISCONTINUED | OUTPATIENT
Start: 2018-01-01 | End: 2018-01-01 | Stop reason: HOSPADM

## 2018-01-01 RX ORDER — VANCOMYCIN HYDROCHLORIDE 1 G/200ML
15 INJECTION, SOLUTION INTRAVENOUS ONCE
Status: COMPLETED | OUTPATIENT
Start: 2018-01-01 | End: 2018-01-01

## 2018-01-01 RX ORDER — MIRTAZAPINE 15 MG/1
15 TABLET, FILM COATED ORAL NIGHTLY
Status: DISCONTINUED | OUTPATIENT
Start: 2018-01-01 | End: 2018-01-01 | Stop reason: HOSPADM

## 2018-01-01 RX ORDER — DEXAMETHASONE SODIUM PHOSPHATE 10 MG/ML
INJECTION INTRAMUSCULAR; INTRAVENOUS AS NEEDED
Status: DISCONTINUED | OUTPATIENT
Start: 2018-01-01 | End: 2018-01-01 | Stop reason: SURG

## 2018-01-01 RX ORDER — PROMETHAZINE HYDROCHLORIDE 25 MG/1
25 TABLET ORAL ONCE AS NEEDED
Status: DISCONTINUED | OUTPATIENT
Start: 2018-01-01 | End: 2018-01-01 | Stop reason: HOSPADM

## 2018-01-01 RX ORDER — DIPHENHYDRAMINE HYDROCHLORIDE 50 MG/ML
12.5 INJECTION INTRAMUSCULAR; INTRAVENOUS
Status: DISCONTINUED | OUTPATIENT
Start: 2018-01-01 | End: 2018-01-01 | Stop reason: HOSPADM

## 2018-01-01 RX ORDER — ACETAMINOPHEN 325 MG/1
325 TABLET ORAL EVERY 4 HOURS PRN
Status: DISCONTINUED | OUTPATIENT
Start: 2018-01-01 | End: 2018-01-01 | Stop reason: HOSPADM

## 2018-01-01 RX ORDER — PROMETHAZINE HYDROCHLORIDE 25 MG/1
25 SUPPOSITORY RECTAL ONCE AS NEEDED
Status: DISCONTINUED | OUTPATIENT
Start: 2018-01-01 | End: 2018-01-01 | Stop reason: HOSPADM

## 2018-01-01 RX ORDER — AMLODIPINE BESYLATE 10 MG/1
10 TABLET ORAL DAILY
COMMUNITY

## 2018-01-01 RX ORDER — CLONAZEPAM 0.5 MG/1
0.5 TABLET ORAL 2 TIMES DAILY PRN
Qty: 6 TABLET | Refills: 0 | Status: SHIPPED | OUTPATIENT
Start: 2018-01-01

## 2018-01-01 RX ORDER — PROMETHAZINE HYDROCHLORIDE 25 MG/ML
12.5 INJECTION, SOLUTION INTRAMUSCULAR; INTRAVENOUS ONCE AS NEEDED
Status: DISCONTINUED | OUTPATIENT
Start: 2018-01-01 | End: 2018-01-01 | Stop reason: HOSPADM

## 2018-01-01 RX ORDER — SODIUM CHLORIDE, SODIUM LACTATE, POTASSIUM CHLORIDE, CALCIUM CHLORIDE 600; 310; 30; 20 MG/100ML; MG/100ML; MG/100ML; MG/100ML
75 INJECTION, SOLUTION INTRAVENOUS CONTINUOUS
Status: DISCONTINUED | OUTPATIENT
Start: 2018-01-01 | End: 2018-01-01 | Stop reason: HOSPADM

## 2018-01-01 RX ORDER — ROCURONIUM BROMIDE 10 MG/ML
INJECTION, SOLUTION INTRAVENOUS AS NEEDED
Status: DISCONTINUED | OUTPATIENT
Start: 2018-01-01 | End: 2018-01-01 | Stop reason: SURG

## 2018-01-01 RX ORDER — POTASSIUM CHLORIDE 7.45 MG/ML
10 INJECTION INTRAVENOUS
Status: DISCONTINUED | OUTPATIENT
Start: 2018-01-01 | End: 2018-01-01 | Stop reason: HOSPADM

## 2018-01-01 RX ORDER — HYDROCODONE BITARTRATE AND ACETAMINOPHEN 7.5; 325 MG/1; MG/1
1 TABLET ORAL EVERY 4 HOURS PRN
Status: DISCONTINUED | OUTPATIENT
Start: 2018-01-01 | End: 2018-01-01 | Stop reason: HOSPADM

## 2018-01-01 RX ORDER — CLINDAMYCIN PHOSPHATE 900 MG/50ML
900 INJECTION INTRAVENOUS ONCE
Status: COMPLETED | OUTPATIENT
Start: 2018-01-01 | End: 2018-01-01

## 2018-01-01 RX ORDER — FENTANYL CITRATE 50 UG/ML
INJECTION, SOLUTION INTRAMUSCULAR; INTRAVENOUS AS NEEDED
Status: DISCONTINUED | OUTPATIENT
Start: 2018-01-01 | End: 2018-01-01 | Stop reason: SURG

## 2018-01-01 RX ORDER — OLANZAPINE 10 MG/1
5 INJECTION, POWDER, LYOPHILIZED, FOR SOLUTION INTRAMUSCULAR EVERY 8 HOURS PRN
Status: DISCONTINUED | OUTPATIENT
Start: 2018-01-01 | End: 2018-01-01 | Stop reason: HOSPADM

## 2018-01-01 RX ORDER — HYDROMORPHONE HCL 110MG/55ML
0.2 PATIENT CONTROLLED ANALGESIA SYRINGE INTRAVENOUS
Status: DISCONTINUED | OUTPATIENT
Start: 2018-01-01 | End: 2018-01-01 | Stop reason: HOSPADM

## 2018-01-01 RX ORDER — POTASSIUM CHLORIDE 1.5 G/1.77G
40 POWDER, FOR SOLUTION ORAL AS NEEDED
Status: DISCONTINUED | OUTPATIENT
Start: 2018-01-01 | End: 2018-01-01 | Stop reason: HOSPADM

## 2018-01-01 RX ORDER — CLONAZEPAM 0.5 MG/1
0.5 TABLET ORAL EVERY 6 HOURS PRN
Status: DISCONTINUED | OUTPATIENT
Start: 2018-01-01 | End: 2018-01-01 | Stop reason: HOSPADM

## 2018-01-01 RX ORDER — ONDANSETRON 2 MG/ML
4 INJECTION INTRAMUSCULAR; INTRAVENOUS ONCE AS NEEDED
Status: DISCONTINUED | OUTPATIENT
Start: 2018-01-01 | End: 2018-01-01 | Stop reason: HOSPADM

## 2018-01-01 RX ORDER — ACETAMINOPHEN 500 MG
500 TABLET ORAL 2 TIMES DAILY
COMMUNITY

## 2018-01-01 RX ORDER — SODIUM CHLORIDE, SODIUM LACTATE, POTASSIUM CHLORIDE, CALCIUM CHLORIDE 600; 310; 30; 20 MG/100ML; MG/100ML; MG/100ML; MG/100ML
9 INJECTION, SOLUTION INTRAVENOUS CONTINUOUS
Status: DISCONTINUED | OUTPATIENT
Start: 2018-01-01 | End: 2018-01-01 | Stop reason: HOSPADM

## 2018-01-01 RX ORDER — MORPHINE SULFATE 2 MG/ML
1 INJECTION, SOLUTION INTRAMUSCULAR; INTRAVENOUS ONCE
Status: COMPLETED | OUTPATIENT
Start: 2018-01-01 | End: 2018-01-01

## 2018-01-01 RX ORDER — HYDROCODONE BITARTRATE AND ACETAMINOPHEN 5; 325 MG/1; MG/1
1 TABLET ORAL EVERY 4 HOURS PRN
Status: DISCONTINUED | OUTPATIENT
Start: 2018-01-01 | End: 2018-01-01 | Stop reason: HOSPADM

## 2018-01-01 RX ORDER — SODIUM CHLORIDE 0.9 % (FLUSH) 0.9 %
10 SYRINGE (ML) INJECTION AS NEEDED
Status: DISCONTINUED | OUTPATIENT
Start: 2018-01-01 | End: 2018-01-01 | Stop reason: HOSPADM

## 2018-01-01 RX ORDER — DOCUSATE SODIUM 100 MG/1
100 CAPSULE, LIQUID FILLED ORAL 2 TIMES DAILY
Status: DISCONTINUED | OUTPATIENT
Start: 2018-01-01 | End: 2018-01-01 | Stop reason: HOSPADM

## 2018-01-01 RX ORDER — BISACODYL 10 MG
10 SUPPOSITORY, RECTAL RECTAL ONCE
Status: COMPLETED | OUTPATIENT
Start: 2018-01-01 | End: 2018-01-01

## 2018-01-01 RX ORDER — CLONAZEPAM 0.5 MG/1
0.5 TABLET ORAL NIGHTLY
Status: DISCONTINUED | OUTPATIENT
Start: 2018-01-01 | End: 2018-01-01

## 2018-01-01 RX ORDER — EPHEDRINE SULFATE 50 MG/ML
5 INJECTION, SOLUTION INTRAVENOUS ONCE AS NEEDED
Status: DISCONTINUED | OUTPATIENT
Start: 2018-01-01 | End: 2018-01-01 | Stop reason: HOSPADM

## 2018-01-01 RX ORDER — VANCOMYCIN HYDROCHLORIDE 1 G/200ML
15 INJECTION, SOLUTION INTRAVENOUS ONCE
Status: DISCONTINUED | OUTPATIENT
Start: 2018-01-01 | End: 2018-01-01

## 2018-01-01 RX ORDER — HYDRALAZINE HYDROCHLORIDE 20 MG/ML
5 INJECTION INTRAMUSCULAR; INTRAVENOUS
Status: DISCONTINUED | OUTPATIENT
Start: 2018-01-01 | End: 2018-01-01 | Stop reason: HOSPADM

## 2018-01-01 RX ORDER — HYDROMORPHONE HYDROCHLORIDE 1 MG/ML
0.5 INJECTION, SOLUTION INTRAMUSCULAR; INTRAVENOUS; SUBCUTANEOUS
Status: DISCONTINUED | OUTPATIENT
Start: 2018-01-01 | End: 2018-01-01 | Stop reason: HOSPADM

## 2018-01-01 RX ORDER — HYDROCODONE BITARTRATE AND ACETAMINOPHEN 7.5; 325 MG/1; MG/1
1-2 TABLET ORAL EVERY 4 HOURS PRN
Qty: 30 TABLET | Refills: 0 | Status: SHIPPED | OUTPATIENT
Start: 2018-01-01

## 2018-01-01 RX ORDER — LABETALOL HYDROCHLORIDE 5 MG/ML
5 INJECTION, SOLUTION INTRAVENOUS
Status: DISCONTINUED | OUTPATIENT
Start: 2018-01-01 | End: 2018-01-01 | Stop reason: HOSPADM

## 2018-01-01 RX ORDER — NALOXONE HCL 0.4 MG/ML
0.2 VIAL (ML) INJECTION AS NEEDED
Status: DISCONTINUED | OUTPATIENT
Start: 2018-01-01 | End: 2018-01-01 | Stop reason: HOSPADM

## 2018-01-01 RX ORDER — CLONAZEPAM 0.5 MG/1
0.5 TABLET ORAL NIGHTLY
COMMUNITY

## 2018-01-01 RX ORDER — SENNA AND DOCUSATE SODIUM 50; 8.6 MG/1; MG/1
2 TABLET, FILM COATED ORAL 2 TIMES DAILY PRN
Status: DISCONTINUED | OUTPATIENT
Start: 2018-01-01 | End: 2018-01-01 | Stop reason: HOSPADM

## 2018-01-01 RX ORDER — MEMANTINE HYDROCHLORIDE 10 MG/1
10 TABLET ORAL EVERY 12 HOURS SCHEDULED
Status: DISCONTINUED | OUTPATIENT
Start: 2018-01-01 | End: 2018-01-01

## 2018-01-01 RX ORDER — AMLODIPINE BESYLATE 10 MG/1
10 TABLET ORAL DAILY
Status: DISCONTINUED | OUTPATIENT
Start: 2018-01-01 | End: 2018-01-01 | Stop reason: HOSPADM

## 2018-01-01 RX ADMIN — SUGAMMADEX 200 MG: 100 INJECTION, SOLUTION INTRAVENOUS at 13:15

## 2018-01-01 RX ADMIN — MIRTAZAPINE 15 MG: 15 TABLET, FILM COATED ORAL at 20:15

## 2018-01-01 RX ADMIN — DEXAMETHASONE SODIUM PHOSPHATE 6 MG: 10 INJECTION INTRAMUSCULAR; INTRAVENOUS at 12:32

## 2018-01-01 RX ADMIN — ATORVASTATIN CALCIUM 10 MG: 10 TABLET, FILM COATED ORAL at 22:00

## 2018-01-01 RX ADMIN — METHYLPREDNISOLONE ACETATE 80 MG: 80 INJECTION, SUSPENSION INTRA-ARTICULAR; INTRALESIONAL; INTRAMUSCULAR; SOFT TISSUE at 13:31

## 2018-01-01 RX ADMIN — CLONAZEPAM 0.5 MG: 0.5 TABLET ORAL at 22:00

## 2018-01-01 RX ADMIN — MEMANTINE HYDROCHLORIDE 10 MG: 10 TABLET, FILM COATED ORAL at 22:00

## 2018-01-01 RX ADMIN — POLYETHYLENE GLYCOL 3350 17 G: 17 POWDER, FOR SOLUTION ORAL at 08:36

## 2018-01-01 RX ADMIN — HYDROCODONE BITARTRATE AND ACETAMINOPHEN 1 TABLET: 5; 325 TABLET ORAL at 20:19

## 2018-01-01 RX ADMIN — FENTANYL CITRATE 50 MCG: 50 INJECTION INTRAMUSCULAR; INTRAVENOUS at 12:20

## 2018-01-01 RX ADMIN — SODIUM CHLORIDE, POTASSIUM CHLORIDE, SODIUM LACTATE AND CALCIUM CHLORIDE 9 ML/HR: 600; 310; 30; 20 INJECTION, SOLUTION INTRAVENOUS at 10:27

## 2018-01-01 RX ADMIN — ASPIRIN 81 MG: 81 TABLET ORAL at 08:33

## 2018-01-01 RX ADMIN — ASPIRIN 81 MG: 81 TABLET ORAL at 09:01

## 2018-01-01 RX ADMIN — POTASSIUM CHLORIDE 10 MEQ: 7.46 INJECTION, SOLUTION INTRAVENOUS at 23:46

## 2018-01-01 RX ADMIN — FAMOTIDINE 20 MG: 10 INJECTION, SOLUTION INTRAVENOUS at 10:26

## 2018-01-01 RX ADMIN — LIDOCAINE HYDROCHLORIDE 60 MG: 20 INJECTION, SOLUTION INFILTRATION; PERINEURAL at 12:20

## 2018-01-01 RX ADMIN — METHYLPREDNISOLONE ACETATE 80 MG: 80 INJECTION, SUSPENSION INTRA-ARTICULAR; INTRALESIONAL; INTRAMUSCULAR; SOFT TISSUE at 13:30

## 2018-01-01 RX ADMIN — ASPIRIN 81 MG: 81 TABLET ORAL at 21:45

## 2018-01-01 RX ADMIN — OLANZAPINE 5 MG: 5 TABLET, ORALLY DISINTEGRATING ORAL at 23:30

## 2018-01-01 RX ADMIN — MIRTAZAPINE 15 MG: 15 TABLET, FILM COATED ORAL at 20:19

## 2018-01-01 RX ADMIN — BUPIVACAINE HYDROCHLORIDE 4 ML: 5 INJECTION, SOLUTION EPIDURAL; INTRACAUDAL at 13:30

## 2018-01-01 RX ADMIN — ASPIRIN 81 MG: 81 TABLET ORAL at 08:36

## 2018-01-01 RX ADMIN — HYDROCODONE BITARTRATE AND ACETAMINOPHEN 1 TABLET: 5; 325 TABLET ORAL at 16:57

## 2018-01-01 RX ADMIN — HYDROMORPHONE HYDROCHLORIDE 0.5 MG: 1 INJECTION, SOLUTION INTRAMUSCULAR; INTRAVENOUS; SUBCUTANEOUS at 08:17

## 2018-01-01 RX ADMIN — BISACODYL 10 MG: 10 SUPPOSITORY RECTAL at 09:36

## 2018-01-01 RX ADMIN — BUPIVACAINE HYDROCHLORIDE 4 ML: 5 INJECTION, SOLUTION EPIDURAL; INTRACAUDAL at 13:31

## 2018-01-01 RX ADMIN — PHENYLEPHRINE HYDROCHLORIDE 50 MCG: 10 INJECTION INTRAVENOUS at 12:58

## 2018-01-01 RX ADMIN — MORPHINE SULFATE 1 MG: 2 INJECTION, SOLUTION INTRAMUSCULAR; INTRAVENOUS at 10:52

## 2018-01-01 RX ADMIN — HYDROCODONE BITARTRATE AND ACETAMINOPHEN 1 TABLET: 5; 325 TABLET ORAL at 11:01

## 2018-01-01 RX ADMIN — ONDANSETRON 4 MG: 2 INJECTION INTRAMUSCULAR; INTRAVENOUS at 13:09

## 2018-01-01 RX ADMIN — AMLODIPINE BESYLATE 10 MG: 10 TABLET ORAL at 09:00

## 2018-01-01 RX ADMIN — CLONAZEPAM 0.5 MG: 0.5 TABLET ORAL at 14:50

## 2018-01-01 RX ADMIN — PHENYLEPHRINE HYDROCHLORIDE 100 MCG: 10 INJECTION INTRAVENOUS at 12:31

## 2018-01-01 RX ADMIN — HYDROMORPHONE HYDROCHLORIDE 0.5 MG: 1 INJECTION, SOLUTION INTRAMUSCULAR; INTRAVENOUS; SUBCUTANEOUS at 05:59

## 2018-01-01 RX ADMIN — VANCOMYCIN HYDROCHLORIDE 1000 MG: 1 INJECTION, SOLUTION INTRAVENOUS at 22:45

## 2018-01-01 RX ADMIN — AMLODIPINE BESYLATE 10 MG: 10 TABLET ORAL at 08:33

## 2018-01-01 RX ADMIN — HYDROMORPHONE HYDROCHLORIDE 0.5 MG: 1 INJECTION, SOLUTION INTRAMUSCULAR; INTRAVENOUS; SUBCUTANEOUS at 15:26

## 2018-01-01 RX ADMIN — POLYETHYLENE GLYCOL 3350 17 G: 17 POWDER, FOR SOLUTION ORAL at 08:33

## 2018-01-01 RX ADMIN — ASPIRIN 81 MG: 81 TABLET ORAL at 20:19

## 2018-01-01 RX ADMIN — HYDROCODONE BITARTRATE AND ACETAMINOPHEN 1 TABLET: 5; 325 TABLET ORAL at 08:34

## 2018-01-01 RX ADMIN — SODIUM CHLORIDE, POTASSIUM CHLORIDE, SODIUM LACTATE AND CALCIUM CHLORIDE 100 ML/HR: 600; 310; 30; 20 INJECTION, SOLUTION INTRAVENOUS at 23:46

## 2018-01-01 RX ADMIN — HYDROMORPHONE HYDROCHLORIDE 0.5 MG: 1 INJECTION, SOLUTION INTRAMUSCULAR; INTRAVENOUS; SUBCUTANEOUS at 23:46

## 2018-01-01 RX ADMIN — ATORVASTATIN CALCIUM 10 MG: 10 TABLET, FILM COATED ORAL at 20:15

## 2018-01-01 RX ADMIN — MIRTAZAPINE 15 MG: 15 TABLET, FILM COATED ORAL at 22:00

## 2018-01-01 RX ADMIN — PHENYLEPHRINE HYDROCHLORIDE 50 MCG: 10 INJECTION INTRAVENOUS at 12:48

## 2018-01-01 RX ADMIN — DOCUSATE SODIUM 100 MG: 100 CAPSULE, LIQUID FILLED ORAL at 08:36

## 2018-01-01 RX ADMIN — DOCUSATE SODIUM 100 MG: 100 CAPSULE, LIQUID FILLED ORAL at 09:00

## 2018-01-01 RX ADMIN — SODIUM CHLORIDE 50 ML/HR: 9 INJECTION, SOLUTION INTRAVENOUS at 15:26

## 2018-01-01 RX ADMIN — POLYETHYLENE GLYCOL 3350 17 G: 17 POWDER, FOR SOLUTION ORAL at 09:01

## 2018-01-01 RX ADMIN — FENTANYL CITRATE 25 MCG: 50 INJECTION INTRAMUSCULAR; INTRAVENOUS at 13:22

## 2018-01-01 RX ADMIN — HYDROCODONE BITARTRATE AND ACETAMINOPHEN 1 TABLET: 5; 325 TABLET ORAL at 01:47

## 2018-01-01 RX ADMIN — CLINDAMYCIN PHOSPHATE 900 MG: 18 INJECTION, SOLUTION INTRAVENOUS at 12:26

## 2018-01-01 RX ADMIN — ASPIRIN 81 MG: 81 TABLET ORAL at 20:15

## 2018-01-01 RX ADMIN — DOCUSATE SODIUM 100 MG: 100 CAPSULE, LIQUID FILLED ORAL at 21:45

## 2018-01-01 RX ADMIN — PROPOFOL 100 MG: 10 INJECTION, EMULSION INTRAVENOUS at 12:20

## 2018-01-01 RX ADMIN — AMLODIPINE BESYLATE 10 MG: 10 TABLET ORAL at 08:36

## 2018-01-01 RX ADMIN — ROCURONIUM BROMIDE 30 MG: 10 INJECTION INTRAVENOUS at 12:20

## 2018-01-01 RX ADMIN — CLONAZEPAM 0.5 MG: 0.5 TABLET ORAL at 20:19

## 2018-01-01 RX ADMIN — ATORVASTATIN CALCIUM 10 MG: 10 TABLET, FILM COATED ORAL at 20:19

## 2018-01-01 RX ADMIN — HYDROCODONE BITARTRATE AND ACETAMINOPHEN 1 TABLET: 5; 325 TABLET ORAL at 14:50

## 2018-01-01 RX ADMIN — ATORVASTATIN CALCIUM 10 MG: 10 TABLET, FILM COATED ORAL at 21:45

## 2018-01-01 RX ADMIN — POTASSIUM CHLORIDE 10 MEQ: 7.46 INJECTION, SOLUTION INTRAVENOUS at 16:37

## 2018-01-01 RX ADMIN — VANCOMYCIN HYDROCHLORIDE 1000 MG: 1 INJECTION, SOLUTION INTRAVENOUS at 10:43

## 2018-01-01 RX ADMIN — CLONAZEPAM 0.5 MG: 0.5 TABLET ORAL at 20:15

## 2018-01-01 RX ADMIN — ACETAMINOPHEN 325 MG: 325 TABLET, FILM COATED ORAL at 11:16

## 2018-01-01 RX ADMIN — MIRTAZAPINE 15 MG: 15 TABLET, FILM COATED ORAL at 21:45

## 2018-01-01 RX ADMIN — POTASSIUM CHLORIDE 10 MEQ: 7.46 INJECTION, SOLUTION INTRAVENOUS at 00:51

## 2018-01-01 RX ADMIN — FENTANYL CITRATE 50 MCG: 50 INJECTION INTRAMUSCULAR; INTRAVENOUS at 13:25

## 2018-01-01 RX ADMIN — POTASSIUM CHLORIDE 10 MEQ: 7.46 INJECTION, SOLUTION INTRAVENOUS at 18:43

## 2018-01-01 RX ADMIN — HYDROCODONE BITARTRATE AND ACETAMINOPHEN 1 TABLET: 5; 325 TABLET ORAL at 22:00

## 2018-01-02 NOTE — PROGRESS NOTES
"Bilateral Knee Follow Up      Patient: Vanna Gardner        YOB: 1934            Chief Complaints: bilateral knee pain      History of Present Illness: Patient is here follow-up of bilateral knee pain she gets intermittent injections does well with those      Physical Exam: 83 y.o. female  General Appearance:    Alert, cooperative, in no acute distress                   Vitals:    01/02/18 1317   Temp: 98.2 °F (36.8 °C)   TempSrc: Temporal Artery    Height: 160 cm (62.99\")        Patient is alert and read ×3 no acute distress appears her above-listed at height weight and age.  Affect is normal respiratory rate is normal unlabored. Heart rate regular rate rhythm, sclera, dentition and hearing are normal for the purpose of this exam.      Ortho Exam     Physical exam of the left knee reveals no effusion, no erythema.  It mild loss of extension and full flexion  Patient has mild varus alignment.  They have mild tenderness to palpation about the medial compartment, no tenderness laterally..  The patient has a negative bounce home, negative Kyrie and a stable ligamentous exam.  Quad tone is reasonable and symmetric.  There are no overlying skin changes no lymphedema no lymphadenopathy.  There is good hip range of motion which is full symmetric and asymptomatic and a normal ankle exam.    Physical exam of the right knee reveals no effusion, no erythema.  It mild loss of extension and full flexion  Patient has mild varus alignment.  They have mild tenderness to palpation about the medial compartment, no tenderness laterally..  The patient has a negative bounce home, negative Kyrie and a stable ligamentous exam.  Quad tone is reasonable and symmetric.  There are no overlying skin changes no lymphedema no lymphadenopathy.  There is good hip range of motion which is full symmetric and asymptomatic and a normal ankle exam.              Assessment/Plan:    Bilateral knee pain with known OA plan is " proceed with injection of both she understands her options      Large Joint Arthrocentesis  Date/Time: 1/2/2018 1:30 PM  Consent given by: patient  Site marked: site marked  Timeout: Immediately prior to procedure a time out was called to verify the correct patient, procedure, equipment, support staff and site/side marked as required   Supporting Documentation  Indications: pain   Procedure Details  Location: knee - R knee  Preparation: Patient was prepped and draped in the usual sterile fashion  Needle size: 25 G  Approach: anteromedial  Medications administered: 4 mL bupivacaine (PF) 0.5 %; 80 mg methylPREDNISolone acetate 80 MG/ML  Patient tolerance: patient tolerated the procedure well with no immediate complications    Large Joint Arthrocentesis  Date/Time: 1/2/2018 1:31 PM  Consent given by: patient  Site marked: site marked  Timeout: Immediately prior to procedure a time out was called to verify the correct patient, procedure, equipment, support staff and site/side marked as required   Supporting Documentation  Indications: pain   Procedure Details  Location: knee - L knee  Preparation: Patient was prepped and draped in the usual sterile fashion  Needle size: 25 G  Approach: anteromedial  Medications administered: 4 mL bupivacaine (PF) 0.5 %; 80 mg methylPREDNISolone acetate 80 MG/ML  Patient tolerance: patient tolerated the procedure well with no immediate complications

## 2018-03-06 PROBLEM — G30.9 ALZHEIMER'S DISEASE (HCC): Status: ACTIVE | Noted: 2018-01-01

## 2018-03-06 PROBLEM — S72.009A HIP FRACTURE (HCC): Status: ACTIVE | Noted: 2018-01-01

## 2018-03-06 PROBLEM — S72.001A CLOSED FRACTURE OF RIGHT HIP (HCC): Status: ACTIVE | Noted: 2018-01-01

## 2018-03-06 PROBLEM — F02.80 ALZHEIMER'S DISEASE (HCC): Status: ACTIVE | Noted: 2018-01-01

## 2018-03-06 NOTE — ED NOTES
2 cm. Lac noted to left scalp with no active bleeding noted. Dried blood present upon exam. Lac cleaned with sterile NS and chlorhexidine, dried and left open to air. Pt tolerated procedure well.     Ashlyn Acuna RN  03/06/18 0713

## 2018-03-06 NOTE — NURSING NOTE
Patient refusing to participate in speech eval. Per NATALIA Avina, ok to let patient eat and drink

## 2018-03-06 NOTE — NURSING NOTE
Attempted to turn patient per unit policy, family currently requesting for patient not to be turned at this time.

## 2018-03-06 NOTE — NURSING NOTE
Family requesting minimal interaction with patient to prevent increase in agitation.  Refusing TEDs, SCDs, traction, and waffle boots at this time.

## 2018-03-06 NOTE — ED NOTES
Pt arrives by [facility] with c/o unwitnessed fall. Pt was placed in c-collar. Denies hitting head, but states that she does have right hip pain.      Tanesha Magdaleno RN  03/06/18 0909       Tanesha Magdaleno RN  03/06/18 0918

## 2018-03-06 NOTE — ED NOTES
Md notified of radiology phoning about pt's hip being broken.     Ashlyn Acuan RN  03/06/18 0968

## 2018-03-06 NOTE — CONSULTS
Patient Name: Vanna Gardner  :1934  83 y.o.    Date of Admission: 3/6/2018  Date of Consultation:  18  Encounter Provider: COMFORT Arriaga  Place of Service: [facility] CARDIOLOGY  Referring Provider: Christian Miller MD  Patient Care Team:  Jenny Vazquez MD as PCP - General (Geriatric Medicine)      Chief complaint: Fall    Reason for consultation    History of Present Illness:  Vanna Faustin is an 83 year old female with history of aortic stenosis, HTN, and dementia. She has been followed by [facility] in the past but has also been seen by Dr. Dunn while she was admitted in . She was admitted for an episode of dizziness. MRI of the brain was negative. She had an echocardiogram with normal LV systolic function, moderate aortic stenosis (details below), mild MR, and grade I diastolic dysfunction. She was discharged relatively quickly.     Patient presented to the emergency room this morning after an unwitnessed fall at the skilled nursing facility. She has considerable dementia and is unable to provide any additional info regarding the fall. She denies chest pain and dyspnea. She appears to be breathing comfortably.     Daughter is at bedside.     Echo 2017  · The left ventricular cavity is mildly dilated.  · Estimated EF appears to be in the range of 56 - 60%.  · Left ventricular diastolic dysfunction is noted (grade I) consistent with impaired relaxation.  · Right ventricular cavity is mildly dilated.  · Left atrial cavity size is severely dilated.  · Right atrial cavity size is moderately dilated.  · There is moderate aortic stenosis with a peak gradient of 41 mmHg, and a mean gradient of 22 mmHg..  · Mild to moderate aortic valve regurgitation is present.  · Mild mitral valve regurgitation is present.  · Mild tricuspid valve regurgitation is present.  · Calculated right ventricular systolic pressure from tricuspid  regurgitation is 30 mmHg.  · There is no evidence of pericardial effusion.    Past Medical History:   Diagnosis Date   • Dementia    • Hypertension        Past Surgical History:   Procedure Laterality Date   • BLADDER SURGERY     • CATARACT EXTRACTION     • EYE SURGERY      eyelid    • HYSTERECTOMY     • SKIN GRAFT     • TOTAL HIP ARTHROPLASTY     • VAGINAL PROLAPSE REPAIR     • VOCAL CORD BIOPSY           Prior to Admission medications    Medication Sig Start Date End Date Taking? Authorizing Provider   ACETAMINOPHEN PO Take 500 mg by mouth Every 6 (Six) Hours As Needed.   Yes Historical Provider, MD   amLODIPine (NORVASC) 10 MG tablet Take 10 mg by mouth Daily.   Yes Historical Provider, MD   Aspirin (ECOTRIN PO) Take  by mouth.   Yes Historical Provider, MD   ATORVASTATIN CALCIUM PO Take  by mouth.   Yes Historical Provider, MD   clonazePAM (KlonoPIN) 0.5 MG tablet Take 0.5 mg by mouth 2 (Two) Times a Day As Needed for Seizures.   Yes Historical Provider, MD   memantine (NAMENDA) 10 MG tablet Take 10 mg by mouth 2 (Two) Times a Day. 8/15/17  Yes Historical Provider, MD   mirtazapine (REMERON) 15 MG tablet Take 15 mg by mouth Every Night.   Yes Historical Provider, MD   Multiple Vitamins-Minerals (MULTIVITAMIN PO) Take  by mouth.   Yes Historical Provider, MD   Omega-3 Fatty Acids (OMEGA 3 PO) Take  by mouth.   Yes Historical Provider, MD   polyethyl glycol-propyl glycol (SYSTANE) 0.4-0.3 % solution ophthalmic solution Administer 1 drop to both eyes Every 2 (Two) Hours As Needed.   Yes Historical Provider, MD       Allergies   Allergen Reactions   • Codeine      rash   • Hydrochlorothiazide    • Latex    • Nickel    • Penicillins        Social History     Social History   • Marital status:      Social History Main Topics   • Smoking status: Current Every Day Smoker     Types: Cigarettes   • Alcohol use Yes   • Drug use: No       History reviewed. No pertinent family history.    REVIEW OF SYSTEMS:   All  systems reviewed.  Pertinent positives identified in HPI.  All other systems are negative.      Objective:     Vitals:    03/06/18 1329 03/06/18 1333 03/06/18 1432 03/06/18 1456   BP:  136/81 117/61 151/71   BP Location:    Right arm   Patient Position:    Lying   Pulse: 88  92 92   Resp:   20 16   Temp:    97.6 °F (36.4 °C)   TempSrc:    Oral   SpO2: 94%  96% 94%   Weight:       Height:         Body mass index is 23.3 kg/(m^2).    General Appearance:    Resting comfortably.    Head:    Normocephalic, without obvious abnormality, atraumatic   Eyes:            Lids and lashes normal, conjunctivae and sclerae normal, no   icterus, no pallor, corneas clear, PERRLA   Ears:    Ears appear intact with no abnormalities noted   Throat:   No oral lesions, no thrush, oral mucosa moist   Neck:   No adenopathy, supple, trachea midline, no thyromegaly, no   carotid bruit, no JVD   Back:     No kyphosis present, no scoliosis present, no skin lesions, erythema or scars, no tenderness to percussion or palpation, range of motion normal   Lungs:     Clear to auscultation,respirations regular, even and unlabored    Heart:    Regular rhythm and normal rate, normal S1 and S2, 3/6 systolic murmur, no gallop, no rub, no click   Chest Wall:    No abnormalities observed   Abdomen:     Normal bowel sounds, no masses, no organomegaly, soft        non-tender, non-distended, no guarding, no rebound  tenderness   Extremities:   no edema, no cyanosis, no redness   Pulses:   Pulses palpable and equal bilaterally. Normal radial, carotid, femoral, dorsalis pedis and posterior tibial pulses bilaterally. Normal abdominal aorta   Skin:  Psychiatric:   No bleeding, bruising or rash    Confused.    Lab Review:       Results from last 7 days  Lab Units 03/06/18  0940   SODIUM mmol/L 143   POTASSIUM mmol/L 3.4*   CHLORIDE mmol/L 103   CO2 mmol/L 29.1*   BUN mg/dL 8   CREATININE mg/dL 0.47*   CALCIUM mg/dL 9.0   BILIRUBIN mg/dL 0.6   ALK PHOS U/L 73   ALT  (SGPT) U/L 34*   AST (SGOT) U/L 42*   GLUCOSE mg/dL 136*           Results from last 7 days  Lab Units 03/06/18  0940   WBC 10*3/mm3 12.91*   HEMOGLOBIN g/dL 14.0   HEMATOCRIT % 41.6   PLATELETS 10*3/mm3 228       Results from last 7 days  Lab Units 03/06/18  0940   INR  1.02       Previous EKG                Assessment and Plan:    1. Fall + right hip fracture - she has no modifiable or prohibitive risks. I do not see evidence of decompensated coronary disease or heart failure. Aortic stenosis was moderate on last exam.  Will review EKG once available and if OK then I would proceed with surgery as indicated.     2. Moderate aortic stenosis - echo in 12/2017 with a peak gradient of 41 mmHg and mean of 22 mmHg. AMBIKA 1.1 cm2.      3. HTN - on norvasc    4. Dementia    Discussed with daughter and RN.     Clarita Kan, COMFORT  03/06/18  3:17 PM

## 2018-03-06 NOTE — PLAN OF CARE
Problem: Fall Risk (Adult)  Goal: Identify Related Risk Factors and Signs and Symptoms  Outcome: Outcome(s) achieved Date Met: 03/06/18    Goal: Absence of Falls  Outcome: Ongoing (interventions implemented as appropriate)      Problem: Fractured Hip (Adult)  Goal: Signs and Symptoms of Listed Potential Problems Will be Absent or Manageable (Fractured Hip)  Outcome: Ongoing (interventions implemented as appropriate)      Problem: Patient Care Overview (Adult)  Goal: Plan of Care Review  Outcome: Ongoing (interventions implemented as appropriate)   03/06/18 5245   Coping/Psychosocial Response Interventions   Plan Of Care Reviewed With patient   Patient Care Overview   Progress no change   Outcome Evaluation   Outcome Summary/Follow up Plan ER admission for unwitnessed fall at NH. patient at baseline confusion. retana placed to BSD. Dilaudid given x 1 with adequate relief in pain. left elbow skin tear noted and exocriation to groin area noted. patient DNR. ST consulted for failed bedside swallow study. patient refused to participate. reg diet per family and NPO after midnight for surgery. patient refusing to take any pills for this RN. family requesting minimal interactions to prevent agitation. plans for OR tomorrow for right hip fracture repair. cleared for OR by cardio.

## 2018-03-06 NOTE — H&P
Name: Vanna Gardner ADMIT: 3/6/2018   : 1934  PCP: Jenny Vazquez MD    MRN: [id number] LOS: 0 days   AGE/SEX: 83 y.o. female  ROOM: 35/35     Chief Complaint   Patient presents with   • Fall     unwitnessed, from [facility] home. Right hip pain       Subjective   Ms. Gardner is a 83 y.o. female with a history of dementia and moderate aortic stenosis who presents to [facility] after having unwitnessed fall at SNF and was found on ground by staff. She has severe dementia and is unable to give reliable history. She does deny any CP, palpitations, SOA, NVD. She did report pain but when asked location, she would instead report days of the week and her name. Discussed with her family in room and she confirmed that she has dementia and pulls at lines and monitors with owning issues at baseline. She also confirmed conditional code/dnr status.    History of Present Illness    Past Medical History:   Diagnosis Date   • Dementia    • Hypertension      Past Surgical History:   Procedure Laterality Date   • BLADDER SURGERY     • CATARACT EXTRACTION     • EYE SURGERY      eyelid    • HYSTERECTOMY     • SKIN GRAFT     • TOTAL HIP ARTHROPLASTY     • VAGINAL PROLAPSE REPAIR     • VOCAL CORD BIOPSY       History reviewed. No pertinent family history.  Social History   Substance Use Topics   • Smoking status: Current Every Day Smoker     Types: Cigarettes   • Smokeless tobacco: None   • Alcohol use Yes       (Not in a hospital admission)  Allergies:    Allergies   Allergen Reactions   • Codeine      rash   • Hydrochlorothiazide    • Latex    • Nickel    • Penicillins        Review of Systems   Unable to perform ROS: Dementia        Objective    Vital Signs  Temp:  [97.2 °F (36.2 °C)] 97.2 °F (36.2 °C)  Heart Rate:  [74-92] 88  Resp:  [14-18] 18  BP: (123-142)/(61-81) 136/81  SpO2:  [93 %-96 %] 94 %  on   ;   O2 Device: room air  Body mass index is 23.3 kg/(m^2).    Physical Exam   Constitutional: She  appears well-developed. No distress.   HENT:   Head: Normocephalic.   Nose: Nose normal.   Eyes: EOM are normal. Pupils are equal, round, and reactive to light.   Neck: Normal range of motion. Neck supple.   Cardiovascular: Normal rate, regular rhythm and intact distal pulses.    Murmur (3/6 RENZO) heard.  Pulmonary/Chest: Effort normal. She has decreased breath sounds. She has no wheezes. She has no rales.   Abdominal: Soft. She exhibits no distension.   Musculoskeletal: She exhibits tenderness. She exhibits no edema.   Neurological: She is alert. No cranial nerve deficit.   O to person   Skin: Skin is dry. She is not diaphoretic.   Psychiatric: She has a normal mood and affect. Cognition and memory are impaired.   Pleasant, redirectable   Nursing note and vitals reviewed.      Results Review:   I reviewed the patient's new clinical results. Reviewed imaging, agree with interpretation. Reviewed telemetry, sinus rhythm. Reviewed prior records.    Lab Results (last 24 hours)     Procedure Component Value Units Date/Time    CBC & Differential [[id number]] Collected:  03/06/18 0940    Specimen:  Blood Updated:  03/06/18 1027    Narrative:       The following orders were created for panel order CBC & Differential.  Procedure                               Abnormality         Status                     ---------                               -----------         ------                     CBC Auto Differential[[id number]]        Abnormal            Final result                 Please view results for these tests on the individual orders.    Comprehensive Metabolic Panel [[id number]]  (Abnormal) Collected:  03/06/18 0940    Specimen:  Blood Updated:  03/06/18 1037     Glucose 136 (H) mg/dL      BUN 8 mg/dL      Creatinine 0.47 (L) mg/dL      Sodium 143 mmol/L      Potassium 3.4 (L) mmol/L      Chloride 103 mmol/L      CO2 29.1 (H) mmol/L      Calcium 9.0 mg/dL      Total Protein 6.4 g/dL      Albumin 3.90 g/dL      ALT (SGPT) 34  (H) U/L      AST (SGOT) 42 (H) U/L      Alkaline Phosphatase 73 U/L      Total Bilirubin 0.6 mg/dL      eGFR Non African Amer 127 mL/min/1.73      Globulin 2.5 gm/dL      A/G Ratio 1.6 g/dL      BUN/Creatinine Ratio 17.0     Anion Gap 10.9 mmol/L     Narrative:       The MDRD GFR formula is only valid for adults with stable renal function between ages 18 and 70.    Protime-INR [[id number]]  (Normal) Collected:  03/06/18 0940    Specimen:  Blood Updated:  03/06/18 1030     Protime 13.2 Seconds      INR 1.02    CBC Auto Differential [[id number]]  (Abnormal) Collected:  03/06/18 0940    Specimen:  Blood Updated:  03/06/18 1027     WBC 12.91 (H) 10*3/mm3      RBC 4.34 10*6/mm3      Hemoglobin 14.0 g/dL      Hematocrit 41.6 %      MCV 95.9 fL      MCH 32.3 (H) pg      MCHC 33.7 g/dL      RDW 13.3 (H) %      RDW-SD 46.4 fl      MPV 11.3 fL      Platelets 228 10*3/mm3      Neutrophil % 84.1 (H) %      Lymphocyte % 6.3 (L) %      Monocyte % 9.3 %      Eosinophil % 0.0 (L) %      Basophil % 0.1 %      Immature Grans % 0.2 %      Neutrophils, Absolute 10.86 (H) 10*3/mm3      Lymphocytes, Absolute 0.81 (L) 10*3/mm3      Monocytes, Absolute 1.20 10*3/mm3      Eosinophils, Absolute 0.00 10*3/mm3      Basophils, Absolute 0.01 10*3/mm3      Immature Grans, Absolute 0.03 10*3/mm3     Urinalysis With / Culture If Indicated - Urine, Catheter [[id number]]  (Abnormal) Collected:  03/06/18 1113    Specimen:  Urine from Urine, Catheter Updated:  03/06/18 1126     Color, UA Yellow     Appearance, UA Clear     pH, UA 7.0     Specific Gravity, UA 1.013     Glucose, UA Negative     Ketones, UA 15 mg/dL (1+) (A)     Bilirubin, UA Negative     Blood, UA Negative     Protein, UA Negative     Leuk Esterase, UA Negative     Nitrite, UA Negative     Urobilinogen, UA 1.0 E.U./dL    Narrative:       Urine microscopic not indicated.          CT Cervical Spine Without Contrast   Preliminary Result   1. There is mild-to-moderate small vessel disease in  the cerebral white   matter.    2. Scalp hematoma and scalp laceration over the posterior superior   midline of the occipital bone from today's head trauma. The remainder of   the head CT is within normal limits. Specifically no acute skull   fracture or intracranial hemorrhage is identified.        CT OF THE CERVICAL SPINE TECHNIQUE: Spiral CT images were obtained from   the skull base down to the T2 thoracic level and images were reformatted   and are submitted in 2 mm thick axial CT section with bone and soft   tissue algorithm, 1 mm thick sagittal and coronal reconstructions were   performed with bone algorithm and 2 mm thick sagittal and coronal   reconstructions were performed with soft tissue algorithm.       FINDINGS: There are prominent arthritic changes at the atlantodental   interval with marginal spurring off the anterior ring of C1 and the   odontoid. Otherwise the C1-2 level is normal in appearance.        At C2-3, there is minimal facet overgrowth.  There is right paracentral   small disc osteophyte complex that abuts the right ventral surface of   the cord, mildly narrowing the canal. There is minimal right and no left   foraminal narrowing.        At C3-4, there is mild-to-moderate right and moderate left facet   overgrowth, disc space narrowing, degenerative endplate change, mild   diffuse posterior disc osteophyte complex abuts and mildly flattens the   ventral surface of the cord contributing to mild-to-moderate canal   narrowing.  There is bilateral uncovertebral joint hypertrophy,   mild-to-moderate right and there is moderate severe left bony foraminal   narrowing.       At C4-5, there is minimal facet overgrowth.  There is disc space   narrowing, degenerative endplate change, mild diffuse posterior disc   osteophyte complex.  There is no central canal narrowing.  There is   bilateral uncovertebral joint hypertrophy and there is mild-to-moderate   bilateral bony foraminal narrowing.        At  C5-6, there is mild right facet overgrowth.  The left facets are   normal.  There is disc space narrowing, degenerative endplate change,   mild posterior endplate spurring, minimal canal narrowing, bilateral   uncovertebral joint hypertrophy and there is mild-to-moderate left and   moderate right bony foraminal narrowing.        At C6-7, there is disc space narrowing, degenerative endplate change,   mild diffuse posterior disc osteophyte complex, only minimal canal   narrowing, some bilateral uncovertebral joint hypertrophy and there is   mild left and there is moderate right bony foraminal narrowing.        At C7-T1, there is moderate bilateral facet overgrowth, 1-2 mm   degenerative anterolisthesis of C7 on T1.  There is no canal or   foraminal narrowing.        No acute fracture is seen in the cervical spine.       IMPRESSION:  No acute fracture is seen in the cervical spine.  There is   mild cervical spondylosis as described in great detail above.        Radiation dose reduction techniques were utilized, including automated   exposure control and exposure modulation based on body size.              CT Head Without Contrast   Preliminary Result   1. There is mild-to-moderate small vessel disease in the cerebral white   matter.    2. Scalp hematoma and scalp laceration over the posterior superior   midline of the occipital bone from today's head trauma. The remainder of   the head CT is within normal limits. Specifically no acute skull   fracture or intracranial hemorrhage is identified.        CT OF THE CERVICAL SPINE TECHNIQUE: Spiral CT images were obtained from   the skull base down to the T2 thoracic level and images were reformatted   and are submitted in 2 mm thick axial CT section with bone and soft   tissue algorithm, 1 mm thick sagittal and coronal reconstructions were   performed with bone algorithm and 2 mm thick sagittal and coronal   reconstructions were performed with soft tissue algorithm.        FINDINGS: There are prominent arthritic changes at the atlantodental   interval with marginal spurring off the anterior ring of C1 and the   odontoid. Otherwise the C1-2 level is normal in appearance.        At C2-3, there is minimal facet overgrowth.  There is right paracentral   small disc osteophyte complex that abuts the right ventral surface of   the cord, mildly narrowing the canal. There is minimal right and no left   foraminal narrowing.        At C3-4, there is mild-to-moderate right and moderate left facet   overgrowth, disc space narrowing, degenerative endplate change, mild   diffuse posterior disc osteophyte complex abuts and mildly flattens the   ventral surface of the cord contributing to mild-to-moderate canal   narrowing.  There is bilateral uncovertebral joint hypertrophy,   mild-to-moderate right and there is moderate severe left bony foraminal   narrowing.       At C4-5, there is minimal facet overgrowth.  There is disc space   narrowing, degenerative endplate change, mild diffuse posterior disc   osteophyte complex.  There is no central canal narrowing.  There is   bilateral uncovertebral joint hypertrophy and there is mild-to-moderate   bilateral bony foraminal narrowing.        At C5-6, there is mild right facet overgrowth.  The left facets are   normal.  There is disc space narrowing, degenerative endplate change,   mild posterior endplate spurring, minimal canal narrowing, bilateral   uncovertebral joint hypertrophy and there is mild-to-moderate left and   moderate right bony foraminal narrowing.        At C6-7, there is disc space narrowing, degenerative endplate change,   mild diffuse posterior disc osteophyte complex, only minimal canal   narrowing, some bilateral uncovertebral joint hypertrophy and there is   mild left and there is moderate right bony foraminal narrowing.        At C7-T1, there is moderate bilateral facet overgrowth, 1-2 mm   degenerative anterolisthesis of C7 on T1.   There is no canal or   foraminal narrowing.        No acute fracture is seen in the cervical spine.       IMPRESSION:  No acute fracture is seen in the cervical spine.  There is   mild cervical spondylosis as described in great detail above.        Radiation dose reduction techniques were utilized, including automated   exposure control and exposure modulation based on body size.              XR Hip With or Without Pelvis 2 - 3 View Right   Preliminary Result   Intertrochanteric right hip fracture.       AP CHEST       FINDINGS:  Heart size is at the upper limits of normal. Lungs appear   free of acute infiltrates. Mildly prominent soft tissue is seen in the   medial aspect of the right upper hemithorax likely related to ectatic   vascular structures. This appears stable since the 11/30/2017 study.       IMPRESSION:   No acute process.          XR Chest 1 View   Preliminary Result   Intertrochanteric right hip fracture.       AP CHEST       FINDINGS:  Heart size is at the upper limits of normal. Lungs appear   free of acute infiltrates. Mildly prominent soft tissue is seen in the   medial aspect of the right upper hemithorax likely related to ectatic   vascular structures. This appears stable since the 11/30/2017 study.       IMPRESSION:   No acute process.            Assessment/Plan   Active Hospital Problems (** Indicates Principal Problem)    Diagnosis Date Noted   • **Closed fracture of right hip [S72.001A] 03/06/2018   • Alzheimer's disease [G30.9] 03/06/2018   • Aortic valve stenosis [I35.0] 11/30/2017   • Essential hypertension [I10] 09/26/2013      Resolved Hospital Problems    Diagnosis Date Noted Date Resolved   No resolved problems to display.     - Closed Right Hip Fracture: PRN pain control and bowel regimen. Gentle IVF while NPO. Consult Orthopedic Surgery.  - Aortic Stenosis: Moderate on prior Echo. Will repeat EKG for pre-op assessment and consult Cardiology.  - HTN: BP acceptable currently. Will  monitor.  - Hypokalemia: Replace, check Magnesium level.  - Dementia: Zyprexa PRN with hx of sundowning. Resume home benzodiazepine prn. Nursing precautions.  - PPx BK/SCD  - Conditional Code/DNR    I discussed the patients findings and my recommendations with patient, family and consulting provider.    Christian Miller MD  [facility]  03/06/18  2:38 PM

## 2018-03-06 NOTE — CONSULTS
Orthopedic Consult      Patient: Vanna Gardner    Date of Admission: 3/6/2018  9:04 AM    YOB: 1934    Medical Record Number: [id number]    Attending Physician: Christian Miller MD    Consulting Physician: Francy Tse M.D.    Chief Complaints: Right intertrochanteric femur fracture    History of Present Illness: 83 y.o. female admitted to [facility] to services of Christian Miller MD with an unstable intertrochanteric femur fracture. Patient has a history of dementia and is unable to give any history on this injury occurred.  Her son is at the bedside.  She presently lives in the memory care unit at the [place].  This apparently was in unwitnessed fall however the staff found her on the floor in her room.  Was brought to the ER and diagnosed with a displaced intertrochanteric femur fracture. Localizes the pain to the groin.  Nursing staff reports that the patient has pain with any movement but appears comfortable at rest.  She and is unable to provide any history.    Allergies   Allergen Reactions   • Codeine      rash   • Hydrochlorothiazide    • Latex    • Nickel    • Penicillins        Medications:   Home Medications:    Current Facility-Administered Medications:   •  acetaminophen (TYLENOL) tablet 650 mg, 650 mg, Oral, Q4H PRN, Christian Miller MD  •  amLODIPine (NORVASC) tablet 10 mg, 10 mg, Oral, Daily, Christian Miller MD  •  atorvastatin (LIPITOR) tablet 10 mg, 10 mg, Oral, Nightly, Christian Miller MD  •  clonazePAM (KlonoPIN) tablet 0.5 mg, 0.5 mg, Oral, BID PRN, Christian Miller MD  •  Glycerin-Hypromellose- (ARTIFICIAL TEARS) 0.2-0.2-1 % ophthalmic solution solution 1 drop, 1 drop, Both Eyes, Q3H PRN, Christian Miller MD  •  HYDROcodone-acetaminophen (NORCO) 5-325 MG per tablet 1 tablet, 1 tablet, Oral, Q4H PRN, Christian Miller MD  •  HYDROmorphone (DILAUDID) injection 0.5 mg, 0.5 mg, Intravenous, Q2H PRN, 0.5 mg at 03/06/18 1526 **AND**  naloxone (NARCAN) injection 0.4 mg, 0.4 mg, Intravenous, Q5 Min PRN, Christian Miller MD  •  memantine (NAMENDA) tablet 10 mg, 10 mg, Oral, Q12H, Christian Miller MD  •  mirtazapine (REMERON) tablet 15 mg, 15 mg, Oral, Nightly, Christian Miller MD  •  OLANZapine zydis (zyPREXA) disintegrating tablet 5 mg, 5 mg, Oral, Nightly PRN, Christian Miller MD  •  ondansetron (ZOFRAN) tablet 4 mg, 4 mg, Oral, Q6H PRN **OR** ondansetron ODT (ZOFRAN-ODT) disintegrating tablet 4 mg, 4 mg, Oral, Q6H PRN **OR** ondansetron (ZOFRAN) injection 4 mg, 4 mg, Intravenous, Q6H PRN, Christian Miller MD  •  potassium chloride (MICRO-K) CR capsule 40 mEq, 40 mEq, Oral, PRN **OR** potassium chloride (KLOR-CON) packet 40 mEq, 40 mEq, Oral, PRN **OR** potassium chloride 10 mEq in 100 mL IVPB, 10 mEq, Intravenous, Q1H PRN, Christian Miller MD, Last Rate: 100 mL/hr at 03/06/18 1637, 10 mEq at 03/06/18 1637  •  sennosides-docusate sodium (SENOKOT-S) 8.6-50 MG tablet 2 tablet, 2 tablet, Oral, BID PRN, Christian Miller MD  •  sodium chloride 0.9 % flush 1-10 mL, 1-10 mL, Intravenous, PRN, Christian Miller MD  •  sodium chloride 0.9 % flush 10 mL, 10 mL, Intravenous, PRN, Giorgi Benito MD  •  Insert peripheral IV, , , Once **AND** sodium chloride 0.9 % flush 10 mL, 10 mL, Intravenous, PRN, Giorgi Benito MD  •  sodium chloride 0.9 % infusion, 50 mL/hr, Intravenous, Continuous, Christian Miller MD, Last Rate: 50 mL/hr at 03/06/18 1526, 50 mL/hr at 03/06/18 1526    Current Medications:  Scheduled Meds:  amLODIPine 10 mg Oral Daily   atorvastatin 10 mg Oral Nightly   memantine 10 mg Oral Q12H   mirtazapine 15 mg Oral Nightly     Continuous Infusions:  sodium chloride 50 mL/hr Last Rate: 50 mL/hr (03/06/18 1526)     PRN Meds:.•  acetaminophen  •  clonazePAM  •  Glycerin-Hypromellose-  •  HYDROcodone-acetaminophen  •  HYDROmorphone **AND** naloxone  •  OLANZapine zydis  •  ondansetron **OR** ondansetron ODT **OR** ondansetron  •   potassium chloride **OR** potassium chloride **OR** potassium chloride  •  sennosides-docusate sodium  •  sodium chloride  •  sodium chloride  •  Insert peripheral IV **AND** sodium chloride    Past Medical History:   Diagnosis Date   • Dementia    • Hypertension        Past Surgical History:   Procedure Laterality Date   • BLADDER SURGERY     • CATARACT EXTRACTION     • EYE SURGERY      eyelid    • HYSTERECTOMY     • SKIN GRAFT     • TOTAL HIP ARTHROPLASTY     • VAGINAL PROLAPSE REPAIR     • VOCAL CORD BIOPSY         Social History     Occupational History   • Not on file.     Social History Main Topics   • Smoking status: Current Every Day Smoker     Types: Cigarettes   • Smokeless tobacco: Not on file   • Alcohol use Yes   • Drug use: No   • Sexual activity: Not on file    Social History     Social History Narrative       History reviewed. No pertinent family history.      Review of Systems:   A 14 point review of systems is reviewed.  The pertinent positives and negatives are listed above.  All others are negative.    Physical Exam: 83 y.o. female    General:  Awake, alert. No acute distress.      Head/Neck:  Normocephalic, atraumatic.  Conjunctiva and sclera clear.  Hearing adequate for the exam.  Neck is supple with normal ROM.    Psych:  Affect and demeanor appropriate.    CV:  Regular rate and rhythm.  Hemodynamically stable.    Lungs:  Good chest expansion, breathing unlabored.    Abdomen:  Soft.  Non-tender, non-distended.    Extremities:      Right lower extremity: Skin appears benign without obvious lacerations, ulcerations or lesions. Leg appears shortened and externally rotated. Compartments soft without evidence for DVT or compartment syndrome. No atrophy. NO palpable masses or adenopathy. Focal TTP over hip. ROM of hip extremely limited and uncomfortable. NO pain with passive motion of knee, ankle. Strength well-preserved distally. Sensation to light touch grossly intact distally. Good skin turgor,  brisk cap refill and good pulses distally.    All other extremities atraumatic without gross abnormality. Contralateral leg demonstrates no bony tenderness. Good hip, knee motion. No instability. Good motor and sensory function distally with brisk cap refill.    Diagnostic Tests:    Admission on 03/06/2018   Component Date Value Ref Range Status   • Extra Tube 03/06/2018 hold for add-on   Final    Auto resulted   • Extra Tube 03/06/2018 Hold for add-ons.   Final    Auto resulted.   • Extra Tube 03/06/2018 hold for add-on   Final    Auto resulted   • Extra Tube 03/06/2018 Hold for add-ons.   Final    Auto resulted.   • Glucose 03/06/2018 136* 65 - 99 mg/dL Final   • BUN 03/06/2018 8  8 - 23 mg/dL Final   • Creatinine 03/06/2018 0.47* 0.57 - 1.00 mg/dL Final   • Sodium 03/06/2018 143  136 - 145 mmol/L Final   • Potassium 03/06/2018 3.4* 3.5 - 5.2 mmol/L Final   • Chloride 03/06/2018 103  98 - 107 mmol/L Final   • CO2 03/06/2018 29.1* 22.0 - 29.0 mmol/L Final   • Calcium 03/06/2018 9.0  8.6 - 10.5 mg/dL Final   • Total Protein 03/06/2018 6.4  6.0 - 8.5 g/dL Final   • Albumin 03/06/2018 3.90  3.50 - 5.20 g/dL Final   • ALT (SGPT) 03/06/2018 34* 1 - 33 U/L Final   • AST (SGOT) 03/06/2018 42* 1 - 32 U/L Final   • Alkaline Phosphatase 03/06/2018 73  39 - 117 U/L Final   • Total Bilirubin 03/06/2018 0.6  0.1 - 1.2 mg/dL Final   • eGFR Non African Amer 03/06/2018 127  >60 mL/min/1.73 Final   • Globulin 03/06/2018 2.5  gm/dL Final   • A/G Ratio 03/06/2018 1.6  g/dL Final   • BUN/Creatinine Ratio 03/06/2018 17.0  7.0 - 25.0 Final   • Anion Gap 03/06/2018 10.9  mmol/L Final   • Protime 03/06/2018 13.2  11.7 - 14.2 Seconds Final   • INR 03/06/2018 1.02  0.90 - 1.10 Final   • Color, UA 03/06/2018 Yellow  Yellow, Straw Final   • Appearance, UA 03/06/2018 Clear  Clear Final   • pH, UA 03/06/2018 7.0  5.0 - 8.0 Final   • Specific Gravity, UA 03/06/2018 1.013  1.005 - 1.030 Final   • Glucose, UA 03/06/2018 Negative  Negative Final    • Ketones, UA 03/06/2018 15 mg/dL (1+)* Negative Final   • Bilirubin, UA 03/06/2018 Negative  Negative Final   • Blood, UA 03/06/2018 Negative  Negative Final   • Protein, UA 03/06/2018 Negative  Negative Final   • Leuk Esterase, UA 03/06/2018 Negative  Negative Final   • Nitrite, UA 03/06/2018 Negative  Negative Final   • Urobilinogen, UA 03/06/2018 1.0 E.U./dL  0.2 - 1.0 E.U./dL Final   • WBC 03/06/2018 12.91* 4.50 - 10.70 10*3/mm3 Final   • RBC 03/06/2018 4.34  3.90 - 5.20 10*6/mm3 Final   • Hemoglobin 03/06/2018 14.0  11.9 - 15.5 g/dL Final   • Hematocrit 03/06/2018 41.6  35.6 - 45.5 % Final   • MCV 03/06/2018 95.9  80.5 - 98.2 fL Final   • MCH 03/06/2018 32.3* 26.9 - 32.0 pg Final   • MCHC 03/06/2018 33.7  32.4 - 36.3 g/dL Final   • RDW 03/06/2018 13.3* 11.7 - 13.0 % Final   • RDW-SD 03/06/2018 46.4  37.0 - 54.0 fl Final   • MPV 03/06/2018 11.3  6.0 - 12.0 fL Final   • Platelets 03/06/2018 228  140 - 500 10*3/mm3 Final   • Neutrophil % 03/06/2018 84.1* 42.7 - 76.0 % Final   • Lymphocyte % 03/06/2018 6.3* 19.6 - 45.3 % Final   • Monocyte % 03/06/2018 9.3  5.0 - 12.0 % Final   • Eosinophil % 03/06/2018 0.0* 0.3 - 6.2 % Final   • Basophil % 03/06/2018 0.1  0.0 - 1.5 % Final   • Immature Grans % 03/06/2018 0.2  0.0 - 0.5 % Final   • Neutrophils, Absolute 03/06/2018 10.86* 1.90 - 8.10 10*3/mm3 Final   • Lymphocytes, Absolute 03/06/2018 0.81* 0.90 - 4.80 10*3/mm3 Final   • Monocytes, Absolute 03/06/2018 1.20  0.20 - 1.20 10*3/mm3 Final   • Eosinophils, Absolute 03/06/2018 0.00  0.00 - 0.70 10*3/mm3 Final   • Basophils, Absolute 03/06/2018 0.01  0.00 - 0.20 10*3/mm3 Final   • Immature Grans, Absolute 03/06/2018 0.03  0.00 - 0.03 10*3/mm3 Final   • ABO Type 03/06/2018 O   Final   • RH type 03/06/2018 Positive   Final   • Antibody Screen 03/06/2018 Negative   Final     Lab Results (last 24 hours)     Procedure Component Value Units Date/Time    CBC & Differential [[id number]] Collected:  03/06/18 0940    Specimen:   Blood Updated:  03/06/18 1027    Narrative:       The following orders were created for panel order CBC & Differential.  Procedure                               Abnormality         Status                     ---------                               -----------         ------                     CBC Auto Differential[[id number]]        Abnormal            Final result                 Please view results for these tests on the individual orders.    CBC Auto Differential [[id number]]  (Abnormal) Collected:  03/06/18 0940    Specimen:  Blood Updated:  03/06/18 1027     WBC 12.91 (H) 10*3/mm3      RBC 4.34 10*6/mm3      Hemoglobin 14.0 g/dL      Hematocrit 41.6 %      MCV 95.9 fL      MCH 32.3 (H) pg      MCHC 33.7 g/dL      RDW 13.3 (H) %      RDW-SD 46.4 fl      MPV 11.3 fL      Platelets 228 10*3/mm3      Neutrophil % 84.1 (H) %      Lymphocyte % 6.3 (L) %      Monocyte % 9.3 %      Eosinophil % 0.0 (L) %      Basophil % 0.1 %      Immature Grans % 0.2 %      Neutrophils, Absolute 10.86 (H) 10*3/mm3      Lymphocytes, Absolute 0.81 (L) 10*3/mm3      Monocytes, Absolute 1.20 10*3/mm3      Eosinophils, Absolute 0.00 10*3/mm3      Basophils, Absolute 0.01 10*3/mm3      Immature Grans, Absolute 0.03 10*3/mm3     Protime-INR [[id number]]  (Normal) Collected:  03/06/18 0940    Specimen:  Blood Updated:  03/06/18 1030     Protime 13.2 Seconds      INR 1.02    Comprehensive Metabolic Panel [[id number]]  (Abnormal) Collected:  03/06/18 0940    Specimen:  Blood Updated:  03/06/18 1037     Glucose 136 (H) mg/dL      BUN 8 mg/dL      Creatinine 0.47 (L) mg/dL      Sodium 143 mmol/L      Potassium 3.4 (L) mmol/L      Chloride 103 mmol/L      CO2 29.1 (H) mmol/L      Calcium 9.0 mg/dL      Total Protein 6.4 g/dL      Albumin 3.90 g/dL      ALT (SGPT) 34 (H) U/L      AST (SGOT) 42 (H) U/L      Alkaline Phosphatase 73 U/L      Total Bilirubin 0.6 mg/dL      eGFR Non African Amer 127 mL/min/1.73      Globulin 2.5 gm/dL      A/G Ratio 1.6  g/dL      BUN/Creatinine Ratio 17.0     Anion Gap 10.9 mmol/L     Narrative:       The MDRD GFR formula is only valid for adults with stable renal function between ages 18 and 70.    [place] Draw [[id number]] Collected:  03/06/18 0940    Specimen:  Blood Updated:  03/06/18 1046    Narrative:       The following orders were created for panel order [place] Draw.  Procedure                               Abnormality         Status                     ---------                               -----------         ------                     Light Blue Top[[id number]]                                   Final result               Green Top (Gel)[[id number]]                                  Final result               Lavender Top[[id number]]                                     Final result               Gold Top - SST[[id number]]                                   Final result                 Please view results for these tests on the individual orders.    Light Blue Top [[id number]] Collected:  03/06/18 0940    Specimen:  Blood Updated:  03/06/18 1046     Extra Tube hold for add-on      Auto resulted       Green Top (Gel) [[id number]] Collected:  03/06/18 0940    Specimen:  Blood Updated:  03/06/18 1046     Extra Tube Hold for add-ons.      Auto resulted.       Lavender Top [[id number]] Collected:  03/06/18 0940    Specimen:  Blood Updated:  03/06/18 1046     Extra Tube hold for add-on      Auto resulted       Gold Top - SST [[id number]] Collected:  03/06/18 0940    Specimen:  Blood Updated:  03/06/18 1046     Extra Tube Hold for add-ons.      Auto resulted.       Urinalysis With / Culture If Indicated - Urine, Catheter [[id number]]  (Abnormal) Collected:  03/06/18 1113    Specimen:  Urine from Urine, Catheter Updated:  03/06/18 1126     Color, UA Yellow     Appearance, UA Clear     pH, UA 7.0     Specific Gravity, UA 1.013     Glucose, UA Negative     Ketones, UA 15 mg/dL (1+) (A)     Bilirubin, UA Negative     Blood, UA Negative      Protein, UA Negative     Leuk Esterase, UA Negative     Nitrite, UA Negative     Urobilinogen, UA 1.0 E.U./dL    Narrative:       Urine microscopic not indicated.          Imaging:  AP pelvis and lateral views of the right hip are available for review on the [employer] system along with the associated report. The films show an intertrochanteric femur fracture.    Assessment:  Right intertrochanteric femur fracture    Plan: I have  had a thorough discussion regarding the risks, benefits and alternatives to non-surgical management versus surgery with the patient's son.. I explained that surgical risks include infection, hematoma, hardware related complications including failure of fixation, loosening, fracture, persistent pain and/or loss of motion, nonunion, malunion, pain necessitating conversion to an arthroplasty, post-operative arthrofibrosis, iatrogenic nerve and/or blood vessel injury resulting in permanent weakness, numbness or dysfunction, RSD, DVT, PE, positioning related neuropraxia, and anesthesia related complications resulting in death. Lastly, we discussed the rehab and all that will be expected of the patient post operatively to ensure an optimal outcome. The patient's son (POA) voices understanding  of the risks, benefits, and alternative forms of treatment that were discussed and consents to proceed with surgical fixation of the fracture.Will plan IM nailing of that right hip tomorrow pending medical clearance    Date: 3/6/2018    Whitney Montilla RN    CC: MD Francy Pantoja MD

## 2018-03-06 NOTE — SIGNIFICANT NOTE
03/06/18 1554   Rehab Treatment   Discipline speech language pathologist   Rehab Evaluation   Evaluation Not Performed other (see comments);patient/family declined evaluation  (Pt failed RN swallow screen, therefore SLP consulted for swallow eval. Pt refused to eat or drink during bedside swallow attempt. Admitted with hip fracture s/p fall. Pt at baseline mental status per family (severe dementia at baseline) and without PNA or dysphagia hx. POA, Fabrice, requests to proceed with regular diet despite patient not participating in bedside swallow. SLP to follow up as indicated after surgery. RN present. )   Recommendations   SLP - Next Appointment (PRN)

## 2018-03-06 NOTE — NURSING NOTE
Family, including POA has decided against Castellanos's Traction to help prevent agitation. Ok from ortho if traction is not set up, stating traction was only ordered for comfort of patient.

## 2018-03-06 NOTE — ED PROVIDER NOTES
EMERGENCY DEPARTMENT ENCOUNTER    CHIEF COMPLAINT  Chief Complaint: Leg pain  History given by: son, patient  History limited by: h/o Alzheimer's  disease  Room Number: P778/1  PMD: Jenny Vazquez MD      HPI:  Pt is a 83 y.o. female who presents complaining of leg pain s/p unwitnessed tfall at [facility] last night. Pt was found on the ground by staff Family believes that the patient had a mechanical fall and is unsure of how long the patient was down for.Family states that the patient's mentation is at baseline. Pt is a DNR    Duration:  unknown  Onset: s/p unwitnessed fall  Timing: last night  Location: E  Radiation: none  Quality: pain  Intensity/Severity: moderate  Progression: constant  Associated Symptoms: not stated  Aggravating Factors: not sattd  Alleviating Factors: not stated  Previous Episodes: not stated  Treatment before arrival: none    PAST MEDICAL HISTORY  Active Ambulatory Problems     Diagnosis Date Noted   • Aortic stenosis 09/26/2013   • Benign essential HTN 08/31/2017   • Essential hypertension 09/26/2013   • Aortic valve stenosis 11/30/2017     Resolved Ambulatory Problems     Diagnosis Date Noted   • No Resolved Ambulatory Problems     Past Medical History:   Diagnosis Date   • Dementia    • Hypertension        PAST SURGICAL HISTORY  Past Surgical History:   Procedure Laterality Date   • BLADDER SURGERY     • CATARACT EXTRACTION     • EYE SURGERY      eyelid    • HYSTERECTOMY     • SKIN GRAFT     • TOTAL HIP ARTHROPLASTY     • VAGINAL PROLAPSE REPAIR     • VOCAL CORD BIOPSY         FAMILY HISTORY  History reviewed. No pertinent family history.    SOCIAL HISTORY  Social History     Social History   • Marital status:      Spouse name: N/A   • Number of children: N/A   • Years of education: N/A     Occupational History   • Not on file.     Social History Main Topics   • Smoking status: Current Every Day Smoker     Types: Cigarettes   • Smokeless tobacco: Not on file   • Alcohol  use Yes   • Drug use: No   • Sexual activity: Not on file     Other Topics Concern   • Not on file     Social History Narrative       ALLERGIES  Codeine; Hydrochlorothiazide; Latex; Nickel; and Penicillins    REVIEW OF SYSTEMS  Review of Systems   Unable to perform ROS: Dementia       PHYSICAL EXAM  ED Triage Vitals   Temp Heart Rate Resp BP SpO2   03/06/18 0902 03/06/18 0858 03/06/18 0901 03/06/18 0858 03/06/18 0902   97.2 °F (36.2 °C) 74 16 142/68 95 %      Temp src Heart Rate Source Patient Position BP Location FiO2 (%)   03/06/18 0902 -- -- -- --   Oral           Physical Exam   Constitutional: She is oriented to person, place, and time and well-developed, well-nourished, and in no distress.   HENT:   Head: Normocephalic and atraumatic.   Eyes: EOM are normal. Pupils are equal, round, and reactive to light.   Neck: Normal range of motion. Neck supple.   Cardiovascular: Normal rate and regular rhythm.    Murmur heard.   Systolic murmur is present with a grade of 2/6   Pulmonary/Chest: Effort normal and breath sounds normal. No respiratory distress.   No obvious chest injury   Abdominal: Soft. There is no tenderness. There is no rebound and no guarding.   Musculoskeletal: Normal range of motion. She exhibits no edema.   Artifical L hip. R leg externally rotated and shortened. R hip TTP.    Neurological: She is alert and oriented to person, place, and time. She has normal sensation and normal strength.   Skin: Skin is warm and dry. No rash noted.   Superficial skin tear to the L elbow   Psychiatric: Mood and affect normal.   Nursing note and vitals reviewed.      LAB RESULTS  Lab Results (last 24 hours)     Procedure Component Value Units Date/Time    CBC & Differential [[id number]] Collected:  03/06/18 0940    Specimen:  Blood Updated:  03/06/18 1027    Narrative:       The following orders were created for panel order CBC & Differential.  Procedure                               Abnormality         Status                      ---------                               -----------         ------                     CBC Auto Differential[[id number]]        Abnormal            Final result                 Please view results for these tests on the individual orders.    Comprehensive Metabolic Panel [[id number]]  (Abnormal) Collected:  03/06/18 0940    Specimen:  Blood Updated:  03/06/18 1037     Glucose 136 (H) mg/dL      BUN 8 mg/dL      Creatinine 0.47 (L) mg/dL      Sodium 143 mmol/L      Potassium 3.4 (L) mmol/L      Chloride 103 mmol/L      CO2 29.1 (H) mmol/L      Calcium 9.0 mg/dL      Total Protein 6.4 g/dL      Albumin 3.90 g/dL      ALT (SGPT) 34 (H) U/L      AST (SGOT) 42 (H) U/L      Alkaline Phosphatase 73 U/L      Total Bilirubin 0.6 mg/dL      eGFR Non African Amer 127 mL/min/1.73      Globulin 2.5 gm/dL      A/G Ratio 1.6 g/dL      BUN/Creatinine Ratio 17.0     Anion Gap 10.9 mmol/L     Narrative:       The MDRD GFR formula is only valid for adults with stable renal function between ages 18 and 70.    Protime-INR [[id number]]  (Normal) Collected:  03/06/18 0940    Specimen:  Blood Updated:  03/06/18 1030     Protime 13.2 Seconds      INR 1.02    CBC Auto Differential [[id number]]  (Abnormal) Collected:  03/06/18 0940    Specimen:  Blood Updated:  03/06/18 1027     WBC 12.91 (H) 10*3/mm3      RBC 4.34 10*6/mm3      Hemoglobin 14.0 g/dL      Hematocrit 41.6 %      MCV 95.9 fL      MCH 32.3 (H) pg      MCHC 33.7 g/dL      RDW 13.3 (H) %      RDW-SD 46.4 fl      MPV 11.3 fL      Platelets 228 10*3/mm3      Neutrophil % 84.1 (H) %      Lymphocyte % 6.3 (L) %      Monocyte % 9.3 %      Eosinophil % 0.0 (L) %      Basophil % 0.1 %      Immature Grans % 0.2 %      Neutrophils, Absolute 10.86 (H) 10*3/mm3      Lymphocytes, Absolute 0.81 (L) 10*3/mm3      Monocytes, Absolute 1.20 10*3/mm3      Eosinophils, Absolute 0.00 10*3/mm3      Basophils, Absolute 0.01 10*3/mm3      Immature Grans, Absolute 0.03 10*3/mm3     Urinalysis With /  Culture If Indicated - Urine, Catheter [[id number]]  (Abnormal) Collected:  03/06/18 1113    Specimen:  Urine from Urine, Catheter Updated:  03/06/18 1126     Color, UA Yellow     Appearance, UA Clear     pH, UA 7.0     Specific Gravity, UA 1.013     Glucose, UA Negative     Ketones, UA 15 mg/dL (1+) (A)     Bilirubin, UA Negative     Blood, UA Negative     Protein, UA Negative     Leuk Esterase, UA Negative     Nitrite, UA Negative     Urobilinogen, UA 1.0 E.U./dL    Narrative:       Urine microscopic not indicated.          I ordered the above labs and reviewed the results    RADIOLOGY  XR Hip With or Without Pelvis 2 - 3 View Right   Final Result   Intertrochanteric right hip fracture.       AP CHEST       FINDINGS:  Heart size is at the upper limits of normal. Lungs appear   free of acute infiltrates. Mildly prominent soft tissue is seen in the   medial aspect of the right upper hemithorax likely related to ectatic   vascular structures. This appears stable since the 11/30/2017 study.       IMPRESSION:   No acute process.       This report was finalized on 3/6/2018 4:45 PM by Dr. Boston Hassan MD.          XR Chest 1 View   Final Result   Intertrochanteric right hip fracture.       AP CHEST       FINDINGS:  Heart size is at the upper limits of normal. Lungs appear   free of acute infiltrates. Mildly prominent soft tissue is seen in the   medial aspect of the right upper hemithorax likely related to ectatic   vascular structures. This appears stable since the 11/30/2017 study.       IMPRESSION:   No acute process.       This report was finalized on 3/6/2018 4:45 PM by Dr. Boston Hassan MD.          CT Cervical Spine Without Contrast   Preliminary Result   1. There is mild-to-moderate small vessel disease in the cerebral white   matter.    2. Scalp hematoma and scalp laceration over the posterior superior   midline of the occipital bone from today's head trauma. The remainder of   the head CT is within  normal limits. Specifically no acute skull   fracture or intracranial hemorrhage is identified.        CT OF THE CERVICAL SPINE TECHNIQUE: Spiral CT images were obtained from   the skull base down to the T2 thoracic level and images were reformatted   and are submitted in 2 mm thick axial CT section with bone and soft   tissue algorithm, 1 mm thick sagittal and coronal reconstructions were   performed with bone algorithm and 2 mm thick sagittal and coronal   reconstructions were performed with soft tissue algorithm.       FINDINGS: There are prominent arthritic changes at the atlantodental   interval with marginal spurring off the anterior ring of C1 and the   odontoid. Otherwise the C1-2 level is normal in appearance.        At C2-3, there is minimal facet overgrowth.  There is right paracentral   small disc osteophyte complex that abuts the right ventral surface of   the cord, mildly narrowing the canal. There is minimal right and no left   foraminal narrowing.        At C3-4, there is mild-to-moderate right and moderate left facet   overgrowth, disc space narrowing, degenerative endplate change, mild   diffuse posterior disc osteophyte complex abuts and mildly flattens the   ventral surface of the cord contributing to mild-to-moderate canal   narrowing.  There is bilateral uncovertebral joint hypertrophy,   mild-to-moderate right and there is moderate severe left bony foraminal   narrowing.       At C4-5, there is minimal facet overgrowth.  There is disc space   narrowing, degenerative endplate change, mild diffuse posterior disc   osteophyte complex.  There is no central canal narrowing.  There is   bilateral uncovertebral joint hypertrophy and there is mild-to-moderate   bilateral bony foraminal narrowing.        At C5-6, there is mild right facet overgrowth.  The left facets are   normal.  There is disc space narrowing, degenerative endplate change,   mild posterior endplate spurring, minimal canal narrowing,  bilateral   uncovertebral joint hypertrophy and there is mild-to-moderate left and   moderate right bony foraminal narrowing.        At C6-7, there is disc space narrowing, degenerative endplate change,   mild diffuse posterior disc osteophyte complex, only minimal canal   narrowing, some bilateral uncovertebral joint hypertrophy and there is   mild left and there is moderate right bony foraminal narrowing.        At C7-T1, there is moderate bilateral facet overgrowth, 1-2 mm   degenerative anterolisthesis of C7 on T1.  There is no canal or   foraminal narrowing.        No acute fracture is seen in the cervical spine.       IMPRESSION:  No acute fracture is seen in the cervical spine.  There is   mild cervical spondylosis as described in great detail above.        Radiation dose reduction techniques were utilized, including automated   exposure control and exposure modulation based on body size.              CT Head Without Contrast   Preliminary Result   1. There is mild-to-moderate small vessel disease in the cerebral white   matter.    2. Scalp hematoma and scalp laceration over the posterior superior   midline of the occipital bone from today's head trauma. The remainder of   the head CT is within normal limits. Specifically no acute skull   fracture or intracranial hemorrhage is identified.        CT OF THE CERVICAL SPINE TECHNIQUE: Spiral CT images were obtained from   the skull base down to the T2 thoracic level and images were reformatted   and are submitted in 2 mm thick axial CT section with bone and soft   tissue algorithm, 1 mm thick sagittal and coronal reconstructions were   performed with bone algorithm and 2 mm thick sagittal and coronal   reconstructions were performed with soft tissue algorithm.       FINDINGS: There are prominent arthritic changes at the atlantodental   interval with marginal spurring off the anterior ring of C1 and the   odontoid. Otherwise the C1-2 level is normal in appearance.         At C2-3, there is minimal facet overgrowth.  There is right paracentral   small disc osteophyte complex that abuts the right ventral surface of   the cord, mildly narrowing the canal. There is minimal right and no left   foraminal narrowing.        At C3-4, there is mild-to-moderate right and moderate left facet   overgrowth, disc space narrowing, degenerative endplate change, mild   diffuse posterior disc osteophyte complex abuts and mildly flattens the   ventral surface of the cord contributing to mild-to-moderate canal   narrowing.  There is bilateral uncovertebral joint hypertrophy,   mild-to-moderate right and there is moderate severe left bony foraminal   narrowing.       At C4-5, there is minimal facet overgrowth.  There is disc space   narrowing, degenerative endplate change, mild diffuse posterior disc   osteophyte complex.  There is no central canal narrowing.  There is   bilateral uncovertebral joint hypertrophy and there is mild-to-moderate   bilateral bony foraminal narrowing.        At C5-6, there is mild right facet overgrowth.  The left facets are   normal.  There is disc space narrowing, degenerative endplate change,   mild posterior endplate spurring, minimal canal narrowing, bilateral   uncovertebral joint hypertrophy and there is mild-to-moderate left and   moderate right bony foraminal narrowing.        At C6-7, there is disc space narrowing, degenerative endplate change,   mild diffuse posterior disc osteophyte complex, only minimal canal   narrowing, some bilateral uncovertebral joint hypertrophy and there is   mild left and there is moderate right bony foraminal narrowing.        At C7-T1, there is moderate bilateral facet overgrowth, 1-2 mm   degenerative anterolisthesis of C7 on T1.  There is no canal or   foraminal narrowing.        No acute fracture is seen in the cervical spine.       IMPRESSION:  No acute fracture is seen in the cervical spine.  There is   mild cervical  spondylosis as described in great detail above.        Radiation dose reduction techniques were utilized, including automated   exposure control and exposure modulation based on body size.                   I ordered the above noted radiological studies. Interpreted by radiologist. Discussed with radiologist (Yani). Reviewed by me in PACS.       PROCEDURES  Procedures  EKG:  Time: 1039  Rate: 89  Interpretation: NSR,narrow complex, LAD, diffuse nonspecific ST and T wave changes, artifact present from movement, Q waves in V I and V II, normal QT, unchanged from prior on 11 2017.     Interpreted Contemporaneously by me, independently viewed    PROGRESS AND CONSULTS  ED Course     1030:Discussed plan for admission at this time with the family, based on XRay hip findings. Discussed diagnosis of Hip fracture andneed for consultation to be placed for admission. Pt understands and agrees with the plan of care. All questions addressed at this time.    1046: Rechecked pt. Discussed Neck CT results and removed C-collar at this time. Pt is noted to have a small superficial abrasion/laceration to the scalp that I will examine once it is cleaned by the staff    1305: Rechecked pt. Pt si noted to have a small skin tear. I discussed Head and Neck CT with the family. Dr. Tse has been over to evaluate the patient, per son's requests and consult and the patient has been scheduled for surgery in the morning. Discussed consultation placed for hospitalist for admission. Pt understands and agrees with the plan of care. All questions addressed at this time.    1327: Discussed case with Dr. Miller, [facility] concerning the patient and the findings in the ED. Dr. Miller requests admittance to med-surg.       MEDICAL DECISION MAKING  Results were reviewed/discussed with the patient and they were also made aware of online access. Pt also made aware that some labs, such as cultures, will not be resulted during ER visit and follow up  with PMD is necessary.     MDM  Number of Diagnoses or Management Options     Amount and/or Complexity of Data Reviewed  Clinical lab tests: reviewed and ordered (WBC 12.91, UA no UTI)  Tests in the radiology section of CPT®: reviewed and ordered (Xray Hip intertrochanteric fracture)  Discuss the patient with other providers: yes (Dr. Miller)    Patient Progress  Patient progress: stable         DIAGNOSIS  Final diagnoses:   Closed fracture of left hip, initial encounter   Severe dementia   Contusion of scalp, initial encounter       DISPOSITION  ADMISSION    Discussed treatment plan and reason for admission with pt/family and admitting physician.  Pt/family voiced understanding of the plan for admission for further testing/treatment as needed.       Latest Documented Vital Signs:  As of 5:46 PM  BP- 151/71 HR- 92 Temp- 97.6 °F (36.4 °C) (Oral) O2 sat- 94%    --  Documentation assistance provided by parish Nelson for Giorgi Benito MD.  Information recorded by the scribe was done at my direction and has been verified and validated by me.       Lynnette Nelson  03/06/18 1330       Giorgi Benito MD  03/06/18 0627

## 2018-03-07 NOTE — OP NOTE
Name: Vanna Gardner  YOB: 1934    DATE OF SURGERY: 3/7/2018    PREOPERATIVE DIAGNOSIS: Right  hip intertrochanteric fracture    POSTOPERATIVE DIAGNOSIS: Right hip intertrochanteric fracture    PROCEDURE PERFORMED: Right hip trochanteric fermoral nail    SURGEON: Darin Lim M.D.    ASSISTANT:     IMPLANTS:     Implant Name Type Inv. Item Serial No.  Lot No. LRB No. Used   NAIL FEM TFN ADV PROX 125D LNG [id number] RT STRL - [id number] Implant NAIL FEM TFN ADV PROX 125D LNG [id number] RT STRL  DEPUY SYNTHES [id number] Right 1   SCRW MARK TFN ADV TI [id number] STRL - [id number] Implant SCRW MARK TFN ADV TI [id number] STRL  DEPUY SYNTHES [id number] Right 1   SCRW LK STRDRV TI [id number] STRL - [id number] Implant SCRW LK STRDRV TI [id number] STRL   DEPUY SYNTHES [id number] Right 1       Estimated Blood Loss: 100cc  Specimens : none  Complications: none    DESCRIPTION OF PROCEDURE:    The patient was taken to the operating room and placed in the supine position. A sequential compression device was carefully placed on the non-operative leg. Preoperative antibiotics were administered. Surgical time out was performed. After adequate induction of anesthesia the patient was then transferred onto the [place] table and positioned appropriately in the supine position. The hip was then prepped and draped in the usual sterile fashion.  C-arm image intensification was then used to confirm proper reduction with indirect traction.  The AP and lateral images demonstrated near anatomic reduction.    A small stab wound was then made a few centimeters proximal to the tip of the greater trochanter.  The deep fascia was then incised in line with the skin incision.  A curved Weeks scissor was then inserted to the tip of the greater trochanter and spread dividing the gluteal muscle.  We then inserted a guide pin at the tip of the greater trochanter is visualized on the AP and lateral C-arm views.  The guidepin was then advanced and  images confirm proper position.  We then overreamed the guidepin with the large reamer.  A beaded guidewire was then inserted.  We measured the length of the guidewire to determine the length of the femoral nail.  The femoral shaft was then reamed over the guidewire.  When we obtained chatter we selected a nail that was 1.5 mm smaller than the last reamer.  The nail was then chosen and advanced over the top of the guidewire.  We used C-arm image intensification to confirm that the nail had seated appropriately with the tip of the nail at the superior portion of the patella.  We again used C-arm image intensification to make sure that the fracture did not displace.  At this point the guidewire was removed.  We used the proximal targeting arm to appropriately position the guidepin for the femoral neck.  We made sure that the tip of the guidepin was at the center center position on AP and lateral views.  The guidepin was then measured.    The lag screw was then overdrilled with the cannulated lag screw drill bit. The final lag screw was placed over the guidepin.   After the lag screw had been placed in the appropriate position C-arm image intensification was used to confirm and then the locking bolt was seated fully to lock the lag screw.  The proximal targeting arm was then removed and the wounds were copiously irrigated with pulsatile lavage.  C-arm images were taken of both the hip and the distal femur which confirmed near anatomic reduction and proper positioning of the hardware.     We then turned our attention to the distal locking bolt.  We used the C-arm in the lateral position and using the perfect [place] technique placed the distal locking bolt in standard fashion.  Again C-arm image intensification was used in both the AP and lateral planes which confirmed proper positioning and size of the hardware.     All wounds were then copiously irrigated with saline and injected with half percent Marcaine solution.   Sterile dressings were applied and the patient was then removed from the [place] table extubated and transferred to PACU for recovery from anesthesia.  At the end of the case the sponge and needle counts were reported to me as being correct there were no known complications.      Darin Lim M.D.  3/7/2018

## 2018-03-07 NOTE — PROGRESS NOTES
"Patient Care Team:  Jenny Vazquez MD as PCP - General (Geriatric Medicine)    Chief Complaint: f/u moderate AS    Interval History: Post-procedure. Arousable      Objective   Vital Signs  Temp:  [97.4 °F (36.3 °C)-99.2 °F (37.3 °C)] 98.3 °F (36.8 °C)  Heart Rate:  [78-93] 83  Resp:  [12-18] 16  BP: (124-155)/() 130/63    Intake/Output Summary (Last 24 hours) at 03/07/18 1517  Last data filed at 03/07/18 1335   Gross per 24 hour   Intake             1335 ml   Output             1350 ml   Net              -15 ml     Flowsheet Rows         First Filed Value    Admission Height  165.1 cm (65\") Documented at 03/06/2018 0858    Admission Weight  63.5 kg (140 lb) Documented at 03/06/2018 0858          General Appearance:    Somnolent but arousable.    Head:    Normocephalic, without obvious abnormality, atraumatic       Neck:   No adenopathy, supple, no thyromegaly, no carotid bruit, no    JVD   Lungs:     Clear to auscultation bilaterally, no wheezes, rales, or     rhonchi    Heart:    Normal rate, regular rhythm,  II/VI systolic murmur peak early    Chest Wall:    No abnormalities observed   Abdomen:     Normal bowel sounds, soft, non-tender, non-distended,            no rebound tenderness   Extremities:   No cyanosis, clubbing, or edema   Pulses:   Pulses palpable and equal bilaterally   Skin:   No bleeding or rash       Neurologic:   Cranial nerves 2 - 12 grossly intact, sensation intact             amLODIPine 10 mg Oral Daily   [MAR Hold] atorvastatin 10 mg Oral Nightly   [MAR Hold] memantine 10 mg Oral Q12H   [MAR Hold] mirtazapine 15 mg Oral Nightly         lactated ringers 75 mL/hr Last Rate: 75 mL/hr (03/07/18 0818)   lactated ringers 9 mL/hr Last Rate: 9 mL/hr (03/07/18 1027)       Results Review:      Results from last 7 days  Lab Units 03/07/18  0351   SODIUM mmol/L 141   POTASSIUM mmol/L 3.8  3.8   CHLORIDE mmol/L 104   CO2 mmol/L 26.7   BUN mg/dL 7*   CREATININE mg/dL 0.45*   GLUCOSE mg/dL 101* "   CALCIUM mg/dL 9.0           Results from last 7 days  Lab Units 03/07/18  0351   WBC 10*3/mm3 9.71   HEMOGLOBIN g/dL 13.4   HEMATOCRIT % 41.9   PLATELETS 10*3/mm3 199       Results from last 7 days  Lab Units 03/07/18  0351 03/06/18  0940   INR  1.10 1.02           Results from last 7 days  Lab Units 03/07/18  0351   MAGNESIUM mg/dL 1.7           I reviewed the patient's new clinical results.  I personally viewed and interpreted the patient's EKG/Telemetry data        Assessment/Plan   1. Fall+right hip fx. S/p right intertrochanteric hip nailing  2. Moderate AS  3. HTN  4. Dementia    -Patient is doing well post-procedure. Hemodynamically stable.   -BP controlled.   -No additional cardiac w/u  -I will sign off.

## 2018-03-07 NOTE — PROGRESS NOTES
Continued Stay Note  [facility]     Patient Name: Vanna Gardner  MRN: [id number]  Today's Date: 3/7/2018    Admit Date: 3/6/2018          Discharge Plan       03/07/18 7662    Case Management/Social Work Plan    Additional Comments Pt is [facility], [facility]agan cert is required prior to d/c for pt to return to a skilled bed. CCP will f/u w/ family and Jamey/Tonya on 3/8.               Discharge Codes     None            Cat Navarro RN

## 2018-03-07 NOTE — PROGRESS NOTES
Name: Vanna Gardner ADMIT: 3/6/2018   : 1934  PCP: Jenny Vazquez MD    MRN: [id number] LOS: 1 days   AGE/SEX: 83 y.o. female  ROOM: [facility]   Subjective   Subjective  Reporting some dyspnea. Denies pain but grimacing intermittently when tries to move. Son at bedside and he confirms that she will pull at lines etc at baseline. Says she slept well overnight. Did not need zyprexa per mar. She is poor historian.    Objective   Vital Signs  Temp:  [97.2 °F (36.2 °C)-97.8 °F (36.6 °C)] 97.8 °F (36.6 °C)  Heart Rate:  [74-93] 82  Resp:  [14-20] 16  BP: (117-151)/(61-81) 150/71  SpO2:  [93 %-99 %] 99 %  on  Flow (L/min):  [3] 3;   O2 Device: nasal cannula  Body mass index is 23.3 kg/(m^2).    Physical Exam   Constitutional: She appears well-developed. No distress.   HENT:   Head: Normocephalic and atraumatic.   Eyes: EOM are normal. Pupils are equal, round, and reactive to light.   Neck: Normal range of motion. Neck supple.   Cardiovascular: Normal rate, regular rhythm and intact distal pulses.    Murmur heard.  Pulmonary/Chest: Effort normal. She has decreased breath sounds. She has no wheezes. She has no rales.   Abdominal: Soft. She exhibits no distension.   Musculoskeletal: She exhibits tenderness. She exhibits no edema.   Neurological: She is alert.   Oriented to person   Skin: Skin is warm and dry. She is not diaphoretic.   Psychiatric: Cognition and memory are impaired.   Confused, redirectable.   Nursing note and vitals reviewed.      Results Review:       I reviewed the patient's new clinical results. Reviewed EKG, sinus rhythm, LVH, no q waves.      Results from last 7 days  Lab Units 18  0351 18  0940   WBC 10*3/mm3 9.71 12.91*   HEMOGLOBIN g/dL 13.4 14.0   PLATELETS 10*3/mm3 199 228     Results from last 7 days  Lab Units 18  0351 03/06/18  0940   SODIUM mmol/L 141 143   POTASSIUM mmol/L 3.8  3.8 3.4*   CHLORIDE mmol/L 104 103   CO2 mmol/L 26.7 29.1*   BUN mg/dL 7* 8    CREATININE mg/dL 0.45* 0.47*   GLUCOSE mg/dL 101* 136*   Estimated Creatinine Clearance: 53.4 mL/min (by C-G formula based on Cr of 0.45).  Results from last 7 days  Lab Units 03/07/18  0351 03/06/18  0940   CALCIUM mg/dL 9.0 9.0   ALBUMIN g/dL 3.20* 3.90   MAGNESIUM mg/dL 1.7  --          amLODIPine 10 mg Oral Daily   atorvastatin 10 mg Oral Nightly   memantine 10 mg Oral Q12H   mirtazapine 15 mg Oral Nightly   vancomycin 15 mg/kg Intravenous Once       lactated ringers 100 mL/hr Last Rate: 100 mL/hr (03/06/18 2346)   sodium chloride 50 mL/hr Last Rate: 50 mL/hr (03/06/18 1526)   NPO Diet      Assessment/Plan   Active Hospital Problems (** Indicates Principal Problem)    Diagnosis Date Noted   • **Closed fracture of right hip [S72.001A] 03/06/2018   • Alzheimer's disease [G30.9] 03/06/2018   • Aortic valve stenosis [I35.0] 11/30/2017   • Essential hypertension [I10] 09/26/2013      Resolved Hospital Problems    Diagnosis Date Noted Date Resolved   No resolved problems to display.     - Closed Right Hip Fracture: PRN pain control and bowel regimen. Gentle IVF while NPO. Planned OR today. Orthopedic surgery following.  - Aortic Stenosis: Moderate on prior Echo. Cardiology following.  - HTN: BP acceptable currently. Will monitor.  - Hypokalemia: Replaced.  - Dementia: Clonazepam, Olanzapine PRN.  - Disposition: SNF/TBD    Christian Miller MD  [facility]  03/07/18  8:14 AM

## 2018-03-07 NOTE — PLAN OF CARE
Problem: Fall Risk (Adult)  Goal: Absence of Falls  Outcome: Ongoing (interventions implemented as appropriate)      Problem: Fractured Hip (Adult)  Goal: Signs and Symptoms of Listed Potential Problems Will be Absent or Manageable (Fractured Hip)  Outcome: Ongoing (interventions implemented as appropriate)      Problem: Patient Care Overview (Adult)  Goal: Plan of Care Review  Outcome: Ongoing (interventions implemented as appropriate)   03/07/18 0224   Coping/Psychosocial Response Interventions   Plan Of Care Reviewed With patient   Patient Care Overview   Progress unable to show any progress toward functional goals   Outcome Evaluation   Outcome Summary/Follow up Plan client admitted r/t R hip fx s/p fall. pain well controlled with prn lortab and dilaudid, aggitation noted when pain increases, client alert to self, has come difficulty alerting staff to pain level. F/C in place, F/C performed. new PIV placed after client pulled previous PIV out. new PIV wrapped in light gauze dsg to discourage picking. cleint NPO at this time r/t impending surgery 3/7/18. potassium level to be rechecked at 0500 r/t potassium level of 3.4 3/6/18. frequent reorientation provided with cares r/t hx dementia.      Goal: Adult Individualization and Mutuality  Outcome: Ongoing (interventions implemented as appropriate)    Goal: Discharge Needs Assessment  Outcome: Ongoing (interventions implemented as appropriate)

## 2018-03-07 NOTE — DISCHARGE PLACEMENT REQUEST
"Vanna Gardner (83 y.o. Female)     Date of Birth Social Security Number Address Home Phone MRN    1934  [address] [place] [id number]  [id number]    [employer]beterian Marital Status          Amish        Admission Date Admission Type Admitting Provider Attending Provider Department, Room/Bed    3/6/18 Emergency Christian Miller MD Baumann, Patrick D, MD [address], P778/1    Discharge Date Discharge Disposition Discharge Destination                      Attending Provider: Christian Miller MD     Allergies:  Codeine, Hydrochlorothiazide, Latex, Nickel, Penicillins    Isolation:  None   Infection:  None   Code Status:  FULL    Ht:  165.1 cm (65\")   Wt:  63.5 kg (140 lb)    Admission Cmt:  None   Principal Problem:  Closed fracture of right hip [S72.001A]                 Active Insurance as of 3/6/2018     Primary Coverage     Payor Plan Insurance Group Employer/Plan Group    ANTHEM MEDICARE REPLACEMENT ANTHEM MEDICARE ADVANTAGE [id number]     Payor Plan Address Payor Plan Phone Number Effective From Effective To    [address] [phone] 4/1/2017     [place] [address]       Subscriber Name Subscriber Birth Date Member ID       VANNA GARDNER 1934 [id number]                 Emergency Contacts      (Rel.) Home Phone Work Phone Mobile Phone    Fabrice Gardner (Son) -- -- [phone]    JobKatie (Daughter) -- -- [phone]              "

## 2018-03-07 NOTE — ANESTHESIA PREPROCEDURE EVALUATION
Anesthesia Evaluation     Patient summary reviewed and Nursing notes reviewed   no history of anesthetic complications:  NPO Solid Status: > 8 hours  NPO Liquid Status: > 8 hours           Airway   Mallampati: III  TM distance: >3 FB  Neck ROM: limited  Possible difficult intubation  Dental    (+) lower dentures and upper dentures    Pulmonary - normal exam    breath sounds clear to auscultation  (+) a smoker Former,   Cardiovascular     ECG reviewed  Rhythm: regular  Rate: normal    (+) hypertension, valvular problems/murmurs AS, murmur,     ROS comment: Ef = 50%      Neuro/Psych  (+) dementia,     (-) psychiatric history  GI/Hepatic/Renal/Endo - negative ROS     Musculoskeletal         ROS comment: fx r hip  Abdominal  - normal exam   Substance History - negative use     OB/GYN negative ob/gyn ROS         Other - negative ROS                       Anesthesia Plan    ASA 3     general     intravenous induction   Anesthetic plan and risks discussed with patient.

## 2018-03-07 NOTE — ANESTHESIA PROCEDURE NOTES
Airway  Urgency: elective    Airway not difficult    General Information and Staff    Patient location during procedure: OR  Anesthesiologist: STAS WITT  CRNA: SANDRA HELLER    Indications and Patient Condition  Indications for airway management: airway protection    Preoxygenated: yes  Mask difficulty assessment: 1 - vent by mask    Final Airway Details  Final airway type: endotracheal airway      Successful airway: ETT  Cuffed: yes   Successful intubation technique: direct laryngoscopy  Endotracheal tube insertion site: oral  Blade: Kaiser  Blade size: #2  ETT size: 7.0 mm  Cormack-Lehane Classification: grade I - full view of glottis  Placement verified by: chest auscultation and capnometry   Cuff volume (mL): 7  Measured from: lips  ETT to lips (cm): 21  Number of attempts at approach: 1    Additional Comments  SIVI.  EYES TAPED CLOSED PRIOR TO DL.  INTUBATION AS CHARTED ABOVE.  APPEARS ATRAUMATIC.  NO CHANGE TO DENTITION. +ETCO2. +BBS. +CR.

## 2018-03-07 NOTE — PLAN OF CARE
Problem: Fall Risk (Adult)  Goal: Absence of Falls  Outcome: Ongoing (interventions implemented as appropriate)      Problem: Fractured Hip (Adult)  Goal: Signs and Symptoms of Listed Potential Problems Will be Absent or Manageable (Fractured Hip)  Outcome: Ongoing (interventions implemented as appropriate)      Problem: Patient Care Overview (Adult)  Goal: Plan of Care Review  Outcome: Ongoing (interventions implemented as appropriate)   03/07/18 1711   Coping/Psychosocial Response Interventions   Plan Of Care Reviewed With patient   Patient Care Overview   Progress improving   Outcome Evaluation   Outcome Summary/Follow up Plan s/p right hip IM nail. WBAT. up to chair with nursing staff. Norco initiated for pain. pills crushed with applesauce. retana to BSD. hx of dementia- patient had baseline for confusion. tolerating regular diet. BP stable. no s/s of HTN voiced or noted. daughter at bedside.        Problem: Pressure Ulcer Risk (Dwain Scale) (Adult,Obstetrics,Pediatric)  Goal: Identify Related Risk Factors and Signs and Symptoms  Outcome: Outcome(s) achieved Date Met: 03/07/18    Goal: Skin Integrity  Outcome: Ongoing (interventions implemented as appropriate)

## 2018-03-07 NOTE — ANESTHESIA POSTPROCEDURE EVALUATION
"Patient: Vanna Gardner    Procedure Summary     Date Anesthesia Start Anesthesia Stop Room / Location    03/07/18 1217 1338  [facility] OR 23 /  [facility]       Procedure Diagnosis Surgeon Provider    RIGHT HIP INTERTROCHANTERIC NAILING (Right Thigh) No diagnosis on file. MD Khanh Lobo MD          Anesthesia Type: general  Last vitals  BP   130/62 (03/07/18 1415)   Temp   36.8 °C (98.3 °F) (03/07/18 1332)   Pulse   91 (03/07/18 1403)   Resp   16 (03/07/18 1415)     SpO2   96 % (03/07/18 1403)     Post Anesthesia Care and Evaluation    Patient location during evaluation: PACU  Patient participation: complete - patient participated  Level of consciousness: awake and alert  Pain management: adequate  Airway patency: patent  Anesthetic complications: No anesthetic complications    Cardiovascular status: acceptable  Respiratory status: acceptable  Hydration status: acceptable    Comments: /62  Pulse 91  Temp 36.8 °C (98.3 °F) (Oral)   Resp 16  Ht 165.1 cm (65\")  Wt 63.5 kg (140 lb)  SpO2 96%  BMI 23.3 kg/m2      "

## 2018-03-08 NOTE — PROGRESS NOTES
Orthopedic Progress Note      Patient: Vanna Gardner  Date of Admission: 3/6/2018  YOB: 1934  Medical Record Number: [id number]    POD # :  1 Day Post-Op Procedure(s) (LRB):  RIGHT HIP INTERTROCHANTERIC NAILING (Right)    Systemic or Specific Complaints: No Complaints    Pain Relief: complete resolution    Physical Exam:  83 y.o.  female  Vitals:  Temp:  [97.4 °F (36.3 °C)-99.8 °F (37.7 °C)] 97.4 °F (36.3 °C)  Heart Rate:  [78-93] 92  Resp:  [12-20] 20  BP: (112-155)/() 130/70  alert, confused and demented  Chest: Clear to auscultation  CV: Regular Rate and Rhythm  Abd: Soft, NT, with BS +  Ext: NV intact. ROM appropriate. Calf is soft and nontender. Negative Homans Sn  Skin: Incision clean dry and intact w/out signs or  symptoms of infection.    Activity: Mobilizing Per P.T.   Weight Bearing: As Tolerated    Data Review     Admission on 03/06/2018   Component Date Value Ref Range Status   • Extra Tube 03/06/2018 hold for add-on   Final    Auto resulted   • Extra Tube 03/06/2018 Hold for add-ons.   Final    Auto resulted.   • Extra Tube 03/06/2018 hold for add-on   Final    Auto resulted   • Extra Tube 03/06/2018 Hold for add-ons.   Final    Auto resulted.   • Glucose 03/06/2018 136* 65 - 99 mg/dL Final   • BUN 03/06/2018 8  8 - 23 mg/dL Final   • Creatinine 03/06/2018 0.47* 0.57 - 1.00 mg/dL Final   • Sodium 03/06/2018 143  136 - 145 mmol/L Final   • Potassium 03/06/2018 3.4* 3.5 - 5.2 mmol/L Final   • Chloride 03/06/2018 103  98 - 107 mmol/L Final   • CO2 03/06/2018 29.1* 22.0 - 29.0 mmol/L Final   • Calcium 03/06/2018 9.0  8.6 - 10.5 mg/dL Final   • Total Protein 03/06/2018 6.4  6.0 - 8.5 g/dL Final   • Albumin 03/06/2018 3.90  3.50 - 5.20 g/dL Final   • ALT (SGPT) 03/06/2018 34* 1 - 33 U/L Final   • AST (SGOT) 03/06/2018 42* 1 - 32 U/L Final   • Alkaline Phosphatase 03/06/2018 73  39 - 117 U/L Final   • Total Bilirubin 03/06/2018 0.6  0.1 - 1.2 mg/dL Final   • eGFR Non African Amer  03/06/2018 127  >60 mL/min/1.73 Final   • Globulin 03/06/2018 2.5  gm/dL Final   • A/G Ratio 03/06/2018 1.6  g/dL Final   • BUN/Creatinine Ratio 03/06/2018 17.0  7.0 - 25.0 Final   • Anion Gap 03/06/2018 10.9  mmol/L Final   • Protime 03/06/2018 13.2  11.7 - 14.2 Seconds Final   • INR 03/06/2018 1.02  0.90 - 1.10 Final   • Color, UA 03/06/2018 Yellow  Yellow, Straw Final   • Appearance, UA 03/06/2018 Clear  Clear Final   • pH, UA 03/06/2018 7.0  5.0 - 8.0 Final   • Specific Gravity, UA 03/06/2018 1.013  1.005 - 1.030 Final   • Glucose, UA 03/06/2018 Negative  Negative Final   • Ketones, UA 03/06/2018 15 mg/dL (1+)* Negative Final   • Bilirubin, UA 03/06/2018 Negative  Negative Final   • Blood, UA 03/06/2018 Negative  Negative Final   • Protein, UA 03/06/2018 Negative  Negative Final   • Leuk Esterase, UA 03/06/2018 Negative  Negative Final   • Nitrite, UA 03/06/2018 Negative  Negative Final   • Urobilinogen, UA 03/06/2018 1.0 E.U./dL  0.2 - 1.0 E.U./dL Final   • WBC 03/06/2018 12.91* 4.50 - 10.70 10*3/mm3 Final   • RBC 03/06/2018 4.34  3.90 - 5.20 10*6/mm3 Final   • Hemoglobin 03/06/2018 14.0  11.9 - 15.5 g/dL Final   • Hematocrit 03/06/2018 41.6  35.6 - 45.5 % Final   • MCV 03/06/2018 95.9  80.5 - 98.2 fL Final   • MCH 03/06/2018 32.3* 26.9 - 32.0 pg Final   • MCHC 03/06/2018 33.7  32.4 - 36.3 g/dL Final   • RDW 03/06/2018 13.3* 11.7 - 13.0 % Final   • RDW-SD 03/06/2018 46.4  37.0 - 54.0 fl Final   • MPV 03/06/2018 11.3  6.0 - 12.0 fL Final   • Platelets 03/06/2018 228  140 - 500 10*3/mm3 Final   • Neutrophil % 03/06/2018 84.1* 42.7 - 76.0 % Final   • Lymphocyte % 03/06/2018 6.3* 19.6 - 45.3 % Final   • Monocyte % 03/06/2018 9.3  5.0 - 12.0 % Final   • Eosinophil % 03/06/2018 0.0* 0.3 - 6.2 % Final   • Basophil % 03/06/2018 0.1  0.0 - 1.5 % Final   • Immature Grans % 03/06/2018 0.2  0.0 - 0.5 % Final   • Neutrophils, Absolute 03/06/2018 10.86* 1.90 - 8.10 10*3/mm3 Final   • Lymphocytes, Absolute 03/06/2018 0.81*  0.90 - 4.80 10*3/mm3 Final   • Monocytes, Absolute 03/06/2018 1.20  0.20 - 1.20 10*3/mm3 Final   • Eosinophils, Absolute 03/06/2018 0.00  0.00 - 0.70 10*3/mm3 Final   • Basophils, Absolute 03/06/2018 0.01  0.00 - 0.20 10*3/mm3 Final   • Immature Grans, Absolute 03/06/2018 0.03  0.00 - 0.03 10*3/mm3 Final   • ABO Type 03/06/2018 O   Final   • RH type 03/06/2018 Positive   Final   • Antibody Screen 03/06/2018 Negative   Final   • WBC 03/07/2018 9.71  4.50 - 10.70 10*3/mm3 Final   • RBC 03/07/2018 4.25  3.90 - 5.20 10*6/mm3 Final   • Hemoglobin 03/07/2018 13.4  11.9 - 15.5 g/dL Final   • Hematocrit 03/07/2018 41.9  35.6 - 45.5 % Final   • MCV 03/07/2018 98.6* 80.5 - 98.2 fL Final   • MCH 03/07/2018 31.5  26.9 - 32.0 pg Final   • MCHC 03/07/2018 32.0* 32.4 - 36.3 g/dL Final   • RDW 03/07/2018 13.6* 11.7 - 13.0 % Final   • RDW-SD 03/07/2018 48.5  37.0 - 54.0 fl Final   • MPV 03/07/2018 10.3  6.0 - 12.0 fL Final   • Platelets 03/07/2018 199  140 - 500 10*3/mm3 Final   • Neutrophil % 03/07/2018 68.0  42.7 - 76.0 % Final   • Lymphocyte % 03/07/2018 16.8* 19.6 - 45.3 % Final   • Monocyte % 03/07/2018 14.3* 5.0 - 12.0 % Final   • Eosinophil % 03/07/2018 0.6  0.3 - 6.2 % Final   • Basophil % 03/07/2018 0.3  0.0 - 1.5 % Final   • Immature Grans % 03/07/2018 0.0  0.0 - 0.5 % Final   • Neutrophils, Absolute 03/07/2018 6.60  1.90 - 8.10 10*3/mm3 Final   • Lymphocytes, Absolute 03/07/2018 1.63  0.90 - 4.80 10*3/mm3 Final   • Monocytes, Absolute 03/07/2018 1.39* 0.20 - 1.20 10*3/mm3 Final   • Eosinophils, Absolute 03/07/2018 0.06  0.00 - 0.70 10*3/mm3 Final   • Basophils, Absolute 03/07/2018 0.03  0.00 - 0.20 10*3/mm3 Final   • Immature Grans, Absolute 03/07/2018 0.00  0.00 - 0.03 10*3/mm3 Final   • Glucose 03/07/2018 101* 65 - 99 mg/dL Final   • BUN 03/07/2018 7* 8 - 23 mg/dL Final   • Creatinine 03/07/2018 0.45* 0.57 - 1.00 mg/dL Final   • Sodium 03/07/2018 141  136 - 145 mmol/L Final   • Potassium 03/07/2018 3.8  3.5 - 5.2 mmol/L  Final   • Chloride 03/07/2018 104  98 - 107 mmol/L Final   • CO2 03/07/2018 26.7  22.0 - 29.0 mmol/L Final   • Calcium 03/07/2018 9.0  8.6 - 10.5 mg/dL Final   • Total Protein 03/07/2018 5.8* 6.0 - 8.5 g/dL Final   • Albumin 03/07/2018 3.20* 3.50 - 5.20 g/dL Final   • ALT (SGPT) 03/07/2018 32  1 - 33 U/L Final   • AST (SGOT) 03/07/2018 43* 1 - 32 U/L Final   • Alkaline Phosphatase 03/07/2018 67  39 - 117 U/L Final   • Total Bilirubin 03/07/2018 0.7  0.1 - 1.2 mg/dL Final   • eGFR Non African Amer 03/07/2018 133  >60 mL/min/1.73 Final   • Globulin 03/07/2018 2.6  gm/dL Final   • A/G Ratio 03/07/2018 1.2  g/dL Final   • BUN/Creatinine Ratio 03/07/2018 15.6  7.0 - 25.0 Final   • Anion Gap 03/07/2018 10.3  mmol/L Final   • Protime 03/07/2018 14.0  11.7 - 14.2 Seconds Final   • INR 03/07/2018 1.10  0.90 - 1.10 Final   • Magnesium 03/07/2018 1.7  1.6 - 2.4 mg/dL Final   • Potassium 03/07/2018 3.8  3.5 - 5.2 mmol/L Final   • Hemoglobin 03/08/2018 13.0  11.9 - 15.5 g/dL Final   • Hematocrit 03/08/2018 41.3  35.6 - 45.5 % Final       Xr Hip 1 View Without Pelvis Right (surgery Only)    Result Date: 3/7/2018  Narrative: 1-VIEW PORTABLE RIGHT HIP  HISTORY: Internal fixation of intertrochanteric fracture.  FINDINGS: Imaging in the operating room shows internal fixation of an intertrochanteric fracture with a long intramedullary josey and intersecting screws and the alignment appears satisfactory. 2 images were obtained.  This report was finalized on 3/7/2018 2:27 PM by Dr. Dru Jay MD.      Ct Head Without Contrast    Result Date: 3/7/2018  Narrative: EMERGENCY NONCONTRAST HEAD CT AND NONCONTRAST CERVICAL SPINE CT 03/06/2018  CLINICAL HISTORY: Patient is status post fall, has headache and neck pain.  HEAD CT TECHNIQUE: Spiral CT images were obtained from the base of the skull to the vertex without intravenous contrast. Images were reformatted and are submitted in 3 mm thick axial CT sections with brain algorithm and 2 mm  thick axial CT sections with high-resolution bone algorithm.  This is correlated to a prior MRI of the head from [facility] 12/01/2017.  FINDINGS: There is some mild patchy low-density in the periventricular and subcortical white matter of the cerebral hemispheres consistent with mild-to-moderate small vessel disease. The remainder of the brain parenchyma is normal in attenuation. The ventricles are normal in size. I see no focal mass effect. There is no midline shift. No extra axial fluid collections are identified. There is no evidence of acute intracranial hemorrhage. No acute skull fracture is identified. The paranasal sinuses and the mastoid air cells and the middle ear cavities are clear. There is a scalp hematoma over the posterior occipital bone and some bubbles of air in the scalp from the associated scalp laceration at this site. No underlying acute skull fracture is identified.      Impression: 1. There is mild-to-moderate small vessel disease in the cerebral white matter. 2. Scalp hematoma and scalp laceration over the posterior superior midline of the occipital bone from today's head trauma. The remainder of the head CT is within normal limits. Specifically no acute skull fracture or intracranial hemorrhage is identified.  CT OF THE CERVICAL SPINE TECHNIQUE: Spiral CT images were obtained from the skull base down to the T2 thoracic level and images were reformatted and are submitted in 2 mm thick axial CT section with bone and soft tissue algorithm, 1 mm thick sagittal and coronal reconstructions were performed with bone algorithm and 2 mm thick sagittal and coronal reconstructions were performed with soft tissue algorithm.  FINDINGS: There are prominent arthritic changes at the atlantodental interval with marginal spurring off the anterior ring of C1 and the odontoid. Otherwise the C1-2 level is normal in appearance.  At C2-3, there is minimal facet overgrowth.  There is right  paracentral small disc osteophyte complex that abuts the right ventral surface of the cord, mildly narrowing the canal. There is minimal right and no left foraminal narrowing.  At C3-4, there is mild-to-moderate right and moderate left facet overgrowth, disc space narrowing, degenerative endplate change, mild diffuse posterior disc osteophyte complex abuts and mildly flattens the ventral surface of the cord contributing to mild-to-moderate canal narrowing.  There is bilateral uncovertebral joint hypertrophy, mild-to-moderate right and there is moderate severe left bony foraminal narrowing.  At C4-5, there is minimal facet overgrowth.  There is disc space narrowing, degenerative endplate change, mild diffuse posterior disc osteophyte complex.  There is no central canal narrowing.  There is bilateral uncovertebral joint hypertrophy and there is mild-to-moderate bilateral bony foraminal narrowing.  At C5-6, there is mild right facet overgrowth.  The left facets are normal.  There is disc space narrowing, degenerative endplate change, mild posterior endplate spurring, minimal canal narrowing, bilateral uncovertebral joint hypertrophy and there is mild-to-moderate left and moderate right bony foraminal narrowing.  At C6-7, there is disc space narrowing, degenerative endplate change, mild diffuse posterior disc osteophyte complex, only minimal canal narrowing, some bilateral uncovertebral joint hypertrophy and there is mild left and there is moderate right bony foraminal narrowing.  At C7-T1, there is moderate bilateral facet overgrowth, 1-2 mm degenerative anterolisthesis of C7 on T1.  There is no canal or foraminal narrowing.  No acute fracture is seen in the cervical spine.  IMPRESSION:  No acute fracture is seen in the cervical spine.  There is mild cervical spondylosis as described in great detail above.  Radiation dose reduction techniques were utilized, including automated exposure control and exposure modulation  based on body size.  This report was finalized on 3/7/2018 6:50 AM by Dr. Hira Sánchez MD.      Ct Cervical Spine Without Contrast    Result Date: 3/7/2018  Narrative: EMERGENCY NONCONTRAST HEAD CT AND NONCONTRAST CERVICAL SPINE CT 03/06/2018  CLINICAL HISTORY: Patient is status post fall, has headache and neck pain.  HEAD CT TECHNIQUE: Spiral CT images were obtained from the base of the skull to the vertex without intravenous contrast. Images were reformatted and are submitted in 3 mm thick axial CT sections with brain algorithm and 2 mm thick axial CT sections with high-resolution bone algorithm.  This is correlated to a prior MRI of the head from [facility] 12/01/2017.  FINDINGS: There is some mild patchy low-density in the periventricular and subcortical white matter of the cerebral hemispheres consistent with mild-to-moderate small vessel disease. The remainder of the brain parenchyma is normal in attenuation. The ventricles are normal in size. I see no focal mass effect. There is no midline shift. No extra axial fluid collections are identified. There is no evidence of acute intracranial hemorrhage. No acute skull fracture is identified. The paranasal sinuses and the mastoid air cells and the middle ear cavities are clear. There is a scalp hematoma over the posterior occipital bone and some bubbles of air in the scalp from the associated scalp laceration at this site. No underlying acute skull fracture is identified.      Impression: 1. There is mild-to-moderate small vessel disease in the cerebral white matter. 2. Scalp hematoma and scalp laceration over the posterior superior midline of the occipital bone from today's head trauma. The remainder of the head CT is within normal limits. Specifically no acute skull fracture or intracranial hemorrhage is identified.  CT OF THE CERVICAL SPINE TECHNIQUE: Spiral CT images were obtained from the skull base down to the T2 thoracic level and images were  reformatted and are submitted in 2 mm thick axial CT section with bone and soft tissue algorithm, 1 mm thick sagittal and coronal reconstructions were performed with bone algorithm and 2 mm thick sagittal and coronal reconstructions were performed with soft tissue algorithm.  FINDINGS: There are prominent arthritic changes at the atlantodental interval with marginal spurring off the anterior ring of C1 and the odontoid. Otherwise the C1-2 level is normal in appearance.  At C2-3, there is minimal facet overgrowth.  There is right paracentral small disc osteophyte complex that abuts the right ventral surface of the cord, mildly narrowing the canal. There is minimal right and no left foraminal narrowing.  At C3-4, there is mild-to-moderate right and moderate left facet overgrowth, disc space narrowing, degenerative endplate change, mild diffuse posterior disc osteophyte complex abuts and mildly flattens the ventral surface of the cord contributing to mild-to-moderate canal narrowing.  There is bilateral uncovertebral joint hypertrophy, mild-to-moderate right and there is moderate severe left bony foraminal narrowing.  At C4-5, there is minimal facet overgrowth.  There is disc space narrowing, degenerative endplate change, mild diffuse posterior disc osteophyte complex.  There is no central canal narrowing.  There is bilateral uncovertebral joint hypertrophy and there is mild-to-moderate bilateral bony foraminal narrowing.  At C5-6, there is mild right facet overgrowth.  The left facets are normal.  There is disc space narrowing, degenerative endplate change, mild posterior endplate spurring, minimal canal narrowing, bilateral uncovertebral joint hypertrophy and there is mild-to-moderate left and moderate right bony foraminal narrowing.  At C6-7, there is disc space narrowing, degenerative endplate change, mild diffuse posterior disc osteophyte complex, only minimal canal narrowing, some bilateral uncovertebral joint  hypertrophy and there is mild left and there is moderate right bony foraminal narrowing.  At C7-T1, there is moderate bilateral facet overgrowth, 1-2 mm degenerative anterolisthesis of C7 on T1.  There is no canal or foraminal narrowing.  No acute fracture is seen in the cervical spine.  IMPRESSION:  No acute fracture is seen in the cervical spine.  There is mild cervical spondylosis as described in great detail above.  Radiation dose reduction techniques were utilized, including automated exposure control and exposure modulation based on body size.  This report was finalized on 3/7/2018 6:50 AM by Dr. Hira Sánchez MD.      Xr Chest 1 View    Result Date: 3/6/2018  Narrative: HISTORY: Fell, hip fracture, preop.  AP PELVIS AND RIGHT HIP 2 VIEWS  FINDINGS:  There is a comminuted intertrochanteric fracture of the right hip which is moderately displaced. Femoral head articulates normally with the acetabulum.  Left hip prosthesis is seen in good position.  Lower lumbar degenerative disease is seen.      Impression: Intertrochanteric right hip fracture.  AP CHEST  FINDINGS:  Heart size is at the upper limits of normal. Lungs appear free of acute infiltrates. Mildly prominent soft tissue is seen in the medial aspect of the right upper hemithorax likely related to ectatic vascular structures. This appears stable since the 11/30/2017 study.  IMPRESSION:   No acute process.  This report was finalized on 3/6/2018 4:45 PM by Dr. Boston Hassan MD.      Fl C Arm During Surgery    Result Date: 3/7/2018  Narrative: This procedure was auto-finalized with no dictation required.    Xr Hip With Or Without Pelvis 2 - 3 View Right    Result Date: 3/7/2018  Narrative: 2-VIEW PORTABLE RIGHT HIP  HISTORY: Internal fixation of intertrochanteric fracture.  FINDINGS: Imaging in the operating room was performed at the time of internal fixation of an intertrochanteric fracture with a long intramedullary josey and intersecting screw. The  alignment appears satisfactory. 4 images were obtained and the fluoroscopy time measures 55 seconds.  This report was finalized on 3/7/2018 2:16 PM by Dr. Dru Jay MD.      Xr Hip With Or Without Pelvis 2 - 3 View Right    Result Date: 3/6/2018  Narrative: HISTORY: Fell, hip fracture, preop.  AP PELVIS AND RIGHT HIP 2 VIEWS  FINDINGS:  There is a comminuted intertrochanteric fracture of the right hip which is moderately displaced. Femoral head articulates normally with the acetabulum.  Left hip prosthesis is seen in good position.  Lower lumbar degenerative disease is seen.      Impression: Intertrochanteric right hip fracture.  AP CHEST  FINDINGS:  Heart size is at the upper limits of normal. Lungs appear free of acute infiltrates. Mildly prominent soft tissue is seen in the medial aspect of the right upper hemithorax likely related to ectatic vascular structures. This appears stable since the 11/30/2017 study.  IMPRESSION:   No acute process.  This report was finalized on 3/6/2018 4:45 PM by Dr. Boston Hassan MD.        Medications:    amLODIPine 10 mg Oral Daily   aspirin 81 mg Oral BID   atorvastatin 10 mg Oral Nightly   docusate sodium 100 mg Oral BID   memantine 10 mg Oral Q12H   mirtazapine 15 mg Oral Nightly   polyethylene glycol 17 g Oral BID     •  acetaminophen **OR** acetaminophen **OR** acetaminophen  •  acetaminophen  •  clonazePAM  •  Glycerin-Hypromellose-  •  HYDROcodone-acetaminophen  •  HYDROcodone-acetaminophen  •  OLANZapine zydis  •  ondansetron **OR** ondansetron ODT **OR** ondansetron    Assessment:  Doing well POD  # 1 Day Post-Op Procedure(s) (LRB):  RIGHT HIP INTERTROCHANTERIC NAILING (Right)  Problem List Items Addressed This Visit     None      Visit Diagnoses     Closed fracture of left hip, initial encounter    -  Primary    Severe dementia        Relevant Medications    mirtazapine (REMERON) 15 MG tablet    Contusion of scalp, initial encounter               Plan:  Continue efforts to mobilize  Continue Pain Control Measures  Continue incisional Care  DVT prophylaxis    Discharge Plan:ok to d/c from ortho standpoint    Lashay Rojo, APRN    Date: 3/8/2018  Time: 6:55 AM

## 2018-03-08 NOTE — PROGRESS NOTES
Discharge Planning Assessment  [facility]     Patient Name: Vanna Gardner  MRN: [id number]  Today's Date: 3/8/2018    Admit Date: 3/6/2018          Discharge Needs Assessment       03/08/18 1637    Living Environment    Lives With facility resident    Living Arrangements --   LTC bed dementia unit.     Home Accessibility no concerns    Stair Railings at Home none    Type of Financial/Environmental Concern none    Transportation Available ambulance    Living Environment    Quality Of Family Relationships supportive;helpful;involved    Able to Return to Prior Living Arrangements no    Discharge Needs Assessment    Readmission Within The Last 30 Days no previous admission in last 30 days    Equipment Currently Used at Home walker, rolling    Discharge Facility/Level Of Care Needs nursing facility, skilled            Discharge Plan       03/08/18 1629    Case Management/Social Work Plan    Plan return to [facility] ([employer] cert PENDING for skilled bed)     Additional Comments Met w/ pt's daughter at the bedside (Katie [phone]). Pt is from a LTC bed at  [facility]  ([facility]). She will return to a skilled dementia bed. [facility]a [facility]/ [employer] is following and will initiate cert once pt is able to work more w/ therapy.         Discharge Placement     Facility/Agency Request Status Selected? Address Phone Number Fax Number    [facility] Accepted    Yes [id number] [facility] [address] [phone]        Cat Navarro RN 3/8/2018 16:41    [employer] cert PENDING. .3/8/2018                             Demographic Summary     None            Functional Status       03/08/18 1639    Functional Status Current    Ambulation 3-->assistive equipment and person    Transferring 3-->assistive equipment and person    Toileting 3-->assistive equipment and person    Bathing 2-->assistive person    Dressing 2-->assistive person    Eating 2-->assistive person     Communication 2-->difficulty understanding (not related to language barrier)    Swallowing (if score 2 or more for any item, consult Rehab Services) 0-->swallows foods/liquids without difficulty    Change in Functional Status Since Onset of Current Illness/Injury yes    Functional Status Prior    Ambulation 1-->assistive equipment    Transferring 1-->assistive equipment    Toileting 3-->assistive equipment and person    Bathing 3-->assistive equipment and person    Dressing 2-->assistive person    Eating 2-->assistive person    Communication 2-->difficulty understanding (not related to language barrier)    Swallowing 0-->swallows foods/liquids without difficulty    IADL    Medications completely dependent    Meal Preparation completely dependent    Housekeeping completely dependent    Laundry completely dependent    Shopping completely dependent    Oral Care assistive person    Activity Tolerance    Usual Activity Tolerance moderate    Current Activity Tolerance poor    Cognitive/Perceptual/Developmental    Current Mental Status/Cognitive Functioning unable to assess   s/w family at the bedside.     Recent Changes in Mental Status/Cognitive Functioning unable to assess            Psychosocial     None            Abuse/Neglect     None            Legal     None            Substance Abuse     None            Patient Forms       03/08/18 1641    Patient Forms    Provider Choice List Delivered    Delivered to Support person    Method of delivery In person   discussed at the bedside          Cat Navarro RN

## 2018-03-08 NOTE — PROGRESS NOTES
Name: Vanna Gardner ADMIT: 3/6/2018   : 1934  PCP: Jenny Vazquez MD    MRN: [id number] LOS: 2 days   AGE/SEX: 83 y.o. female  ROOM: [facility]   Subjective   Subjective  Pulled IV out yesterday evening, replaced for post-op abx. Now alert and calm. She denies pain SOA NV. Discussed with daughter and she was in memory care area of NH, she does not think she was in skilled nursing care. Also reports that she has not taken namenda for some time.    Objective   Vital Signs  Temp:  [97.4 °F (36.3 °C)-99.8 °F (37.7 °C)] 98.3 °F (36.8 °C)  Heart Rate:  [78-93] 92  Resp:  [12-20] 16  BP: (112-155)/() 132/61  SpO2:  [90 %-96 %] 94 %  on  Flow (L/min):  [2-4] 2;   O2 Device: room air  Body mass index is 23.3 kg/(m^2).    Physical Exam   Constitutional: She appears well-developed. No distress.   HENT:   Head: Normocephalic and atraumatic.   Eyes: EOM are normal. Pupils are equal, round, and reactive to light.   Neck: Normal range of motion. Neck supple.   Cardiovascular: Normal rate, regular rhythm and intact distal pulses.    Murmur heard.  Pulmonary/Chest: Effort normal. She has decreased breath sounds. She has no wheezes. She has no rales.   Abdominal: Soft. She exhibits no distension.   Musculoskeletal: She exhibits no edema.   Neurological: She is alert.   Oriented to person   Skin: Skin is warm and dry. She is not diaphoretic.   Psychiatric: She has a normal mood and affect. Cognition and memory are impaired.   Redirectable.   Nursing note and vitals reviewed.      Results Review:       I reviewed the patient's new clinical results. Reviewed imaging, agree with interpretation.    Results from last 7 days  Lab Units 18  0403 18  0351 18  0940   WBC 10*3/mm3  --  9.71 12.91*   HEMOGLOBIN g/dL 13.0 13.4 14.0   PLATELETS 10*3/mm3  --  199 228     Results from last 7 days  Lab Units 18  0351 18  0940   SODIUM mmol/L 141 143   POTASSIUM mmol/L 3.8  3.8 3.4*   CHLORIDE mmol/L  104 103   CO2 mmol/L 26.7 29.1*   BUN mg/dL 7* 8   CREATININE mg/dL 0.45* 0.47*   GLUCOSE mg/dL 101* 136*   Estimated Creatinine Clearance: 53.4 mL/min (by C-G formula based on Cr of 0.45).  Results from last 7 days  Lab Units 03/07/18  0351 03/06/18  0940   CALCIUM mg/dL 9.0 9.0   ALBUMIN g/dL 3.20* 3.90   MAGNESIUM mg/dL 1.7  --          amLODIPine 10 mg Oral Daily   aspirin 81 mg Oral BID   atorvastatin 10 mg Oral Nightly   docusate sodium 100 mg Oral BID   mirtazapine 15 mg Oral Nightly   polyethylene glycol 17 g Oral BID      Diet Regular      Assessment/Plan   Active Hospital Problems (** Indicates Principal Problem)    Diagnosis Date Noted   • **Closed fracture of right hip [S72.001A] 03/06/2018   • Alzheimer's disease [G30.9] 03/06/2018   • Aortic valve stenosis [I35.0] 11/30/2017   • Essential hypertension [I10] 09/26/2013      Resolved Hospital Problems    Diagnosis Date Noted Date Resolved   No resolved problems to display.     - Closed Right Hip Fracture: sp right hip trochanteric fermoral nail 03/07. Continue pain control and bowel regimen. PT/CCP consults. Orthopedic surgery following.  - Aortic Stenosis: Moderate on prior Echo. Cardiology following.  - HTN: Stable.   - Hypokalemia: Resolved.  - Dementia: Clonazepam, Olanzapine PRN. Will stop namenda and ask for records to confirm med rec.  - Disposition: SNF/stable for discharge today pending PT eval and disposition arrangements    Christian Miller MD  [facility]  03/08/18  7:28 AM

## 2018-03-08 NOTE — PLAN OF CARE
Problem: Patient Care Overview (Adult)  Goal: Plan of Care Review   03/08/18 1107   Coping/Psychosocial Response Interventions   Plan Of Care Reviewed With patient   Outcome Evaluation   Outcome Summary/Follow up Plan Pt. will benefit from skilled inpt. P.T. to address her functional deficits and to assist pt. in regaining her maximum level of independence with functional mobility. Pt.'s progress will depend on her ability to cooperate and follow commands with P.T.       Problem: Inpatient Physical Therapy  Goal: Bed Mobility Goal LTG- PT   03/08/18 1107   Bed Mobility PT LTG   Bed Mobility PT LTG, Date Established 03/08/18   Bed Mobility PT LTG, Time to Achieve 5 - 7 days   Bed Mobility PT LTG, Activity Type all bed mobility   Bed Mobility PT LTG, [place] Level contact guard assist     Goal: Transfer Training Goal 1 LTG- PT   03/08/18 1107   Transfer Training PT LTG   Transfer Training PT LTG, Date Established 03/08/18   Transfer Training PT LTG, Time to Achieve 5 - 7 days   Transfer Training PT LTG, Activity Type all transfers   Transfer Training PT LTG, [place] Level contact guard assist;2 person assist required   Transfer Training PT LTG, Assist Device walker, rolling     Goal: Gait Training Goal LTG- PT   03/08/18 1107   Gait Training PT LTG   Gait Training Goal PT LTG, Date Established 03/08/18   Gait Training Goal PT LTG, Time to Achieve 5 - 7 days   Gait Training Goal PT LTG, [place] Level contact guard assist;2 person assist required   Gait Training Goal PT LTG, Assist Device walker, rolling   Gait Training Goal PT LTG, Distance to Achieve 20 feet

## 2018-03-08 NOTE — PLAN OF CARE
Problem: Fall Risk (Adult)  Goal: Absence of Falls  Outcome: Ongoing (interventions implemented as appropriate)      Problem: Patient Care Overview (Adult)  Goal: Adult Individualization and Mutuality  Outcome: Ongoing (interventions implemented as appropriate)   03/08/18 0243   Individualization   Patient Specific Goals control pain and increase mobility   Patient Specific Interventions pain controlled during shift with PO pain meds; very confused; pulled out IV and attempted to pull out catheter; retana catheter removed- due to void 0930; brief in place; vss; sat up in chair today; assist x 2; plan to d/c when ready       Problem: Pressure Ulcer Risk (Dwain Scale) (Adult,Obstetrics,Pediatric)  Goal: Skin Integrity  Outcome: Ongoing (interventions implemented as appropriate)      Problem: Perioperative Period (Adult)  Goal: Signs and Symptoms of Listed Potential Problems Will be Absent or Manageable (Perioperative Period)  Outcome: Ongoing (interventions implemented as appropriate)

## 2018-03-08 NOTE — THERAPY EVALUATION
"Acute Care - Physical Therapy Initial Evaluation  [facility]     Patient Name: Vanna Gardner  : 1934  MRN: [id number]  Today's Date: 3/8/2018   Onset of Illness/Injury or Date of Surgery Date: 18  Date of Referral to PT: 18  Referring Physician: Darin Espinal      Admit Date: 3/6/2018     Visit Dx:    ICD-10-CM ICD-9-CM   1. Closed fracture of left hip, initial encounter S72.002A 820.8   2. Severe dementia F03.90 294.20   3. Contusion of scalp, initial encounter S00.03XA 920   4. Difficulty walking R26.2 719.7     Patient Active Problem List   Diagnosis   • Aortic stenosis   • Benign essential HTN   • Essential hypertension   • Aortic valve stenosis   • Closed fracture of right hip   • Alzheimer's disease     Past Medical History:   Diagnosis Date   • Dementia    • Hypertension      Past Surgical History:   Procedure Laterality Date   • BLADDER SURGERY     • CATARACT EXTRACTION     • EYE SURGERY      eyelid    • FEMUR IM NAILING/RODDING Right 3/7/2018    Procedure: RIGHT HIP INTERTROCHANTERIC NAILING;  Surgeon: Darin Lim MD;  Location: [facility] MAIN OR;  Service:    • HYSTERECTOMY     • SKIN GRAFT     • TOTAL HIP ARTHROPLASTY     • VAGINAL PROLAPSE REPAIR     • VOCAL CORD BIOPSY            PT ASSESSMENT (last 72 hours)      PT Evaluation       18 1059 18 0833    Rehab Evaluation    Document Type evaluation  -MS     Subjective Information complains of;pain  -MS     Patient Effort, Rehab Treatment fair  -MS     Symptoms Noted Comment Pt. very confused (baseline dementia per chart review). Pt. agitates easily with safety instruction during mobility and on several occ.'s swatted her U.E.'s at the P.T.'s attempted to assist her with mobility.  Pt. c/o being \"sore\" in her Right \"thigh\" but when asked specifically about pain, pt. does not respond  -MS     General Information    Onset of Illness/Injury or Date of Surgery Date 18  -MS     Referring Physician Darin Espinal  " -MS     Pertinent History Of Current Problem Pt. s/p Right Hip Nailing  -MS     Precautions/Limitations fall precautions   Exit alarm; WBAT Right L.E.  -MS     Prior Level of Function independent:  -MS     Equipment Currently Used at Home walker, rolling   Per pt.'s family report  -MS     Plans/Goals Discussed With patient  -MS     Risks Reviewed patient:  -MS     Benefits Reviewed patient:  -MS     Barriers to Rehab cognitive status  -MS     Clinical Impression    Date of Referral to PT 03/07/18  -MS     Criteria for Skilled Therapeutic Interventions Met treatment indicated  -MS     Rehab Potential fair, will monitor progress closely  -MS     Pain Assessment    Pain Assessment Foster-Ibarra FACES  -MS     Foster-Ibarra FACES Pain Rating 2  -MS     Pain Location Leg  -MS     Pain Orientation Right  -MS     Cognitive Assessment/Intervention    Current Cognitive/Communication Assessment impaired  -MS     Orientation Status disoriented x 4  -MS     Follows Commands/Answers Questions 25% of the time;able to follow single-step instructions;needs cueing;needs increased time;needs repetition  -MS     Personal Safety decreased awareness, need for assist;decreased awareness, need for safety;one on one supervision required for safety  -MS     Personal Safety Interventions fall prevention program maintained;gait belt;nonskid shoes/slippers when out of bed;supervised activity  -MS     ROM (Range of Motion)    General ROM --   Unable to accurately assess; Pt. would not follow commands  -MS     MMT (Manual Muscle Testing)    General MMT Assessment --   Unable to assess; Pt. would not follow commands  -MS     Muscle Tone Assessment    Bilateral Upper Extremities Muscle Tone Assessment  other (see comments)   no arm drift  -KT    Mobility Assessment/Training    Extremity Weight-Bearing Status right lower extremity  -MS     Right Lower Extremity Weight-Bearing weight-bearing as tolerated  -MS     Bed Mobility, Assessment/Treatment    Bed  Mob, Supine to Sit, [place] minimum assist (75% patient effort)  -MS     Transfer Assessment/Treatment    Transfers, Sit-Stand [place] minimum assist (75% patient effort);2 person assist required  -MS     Transfers, Stand-Sit [place] minimum assist (75% patient effort);2 person assist required  -MS     Transfers, Sit-Stand-Sit, Assist Device rolling walker  -MS     Transfer, Comment Mod. verbal/tactile cues to correct initial standing posture; Once pt. squared up to chair and chair locked pt. became agitated when she was asked to sit down in chair.  Had to assist pt. to a sitting position as she began to throw her arms toward P.T. staff and become agitated.  -MS     Gait Assessment/Treatment    Gait, [place] Level minimum assist (75% patient effort);2 person assist required  -MS     Gait, Assistive Device rolling walker  -MS     Gait, Distance (Feet) 5  -MS     Gait, Gait Deviations right:;antalgic;nawaf decreased;forward flexed posture;narrow base;step length decreased  -MS     Gait, Comment Pt. requires max. verbal/tactile cues for posture correction, to increase her bilateral step length, and for RWX guidance.  Followed pt. with a chair for safety as she becomes easily agitated and swats at P.T.'s occ. when standing up.  -MS     Therapy Exercises    Exercise Protocols --   Attempted but pt. not following commands and resists AAROM.  -MS     Positioning and Restraints    Pre-Treatment Position in bed  -MS     Post Treatment Position chair  -MS     In Chair notified nsg;reclined;sitting;call light within reach;encouraged to call for assist;exit alarm on;with nsg   All lines intact.   -MS       03/07/18 1552 03/07/18 0817    Muscle Tone Assessment    Muscle Tone Assessment Bilateral Upper Extremities  -AA Bilateral Upper Extremities  -AA    Bilateral Upper Extremities Muscle Tone Assessment other (see comments)   no arm drift  -AA other (see comments)   no arm drift  -AA      03/07/18 0228  03/06/18 1600    General Information    Equipment Currently Used at Home  cane, straight;rollator;shower chair  -AA    Living Environment    Transportation Available ambulance  -       03/06/18 1554 03/06/18 1526    Rehab Evaluation    Evaluation Not Performed other (see comments);patient/family declined evaluation   Pt failed RN swallow screen, therefore SLP consulted for swallow eval. Pt refused to eat or drink. Admitted with hip fracture s/p fall. Pt at baseline mental status per family and without PNA or dysphagia hx. Fabrice FISHER, requests to proceed with regular   -     Muscle Tone Assessment    Muscle Tone Assessment  Bilateral Upper Extremities  -AA    Bilateral Upper Extremities Muscle Tone Assessment  other (see comments)   no arm drift  -AA      03/06/18 1513       Living Environment    Lives With facility resident  -     Living Arrangements extended care facility   [facility]  -     Home Accessibility no concerns  -     Stair Railings at Home none  -     Type of Financial/Environmental Concern none  -AA     Transportation Available ambulance  -       User Key  (r) = Recorded By, (t) = Taken By, (c) = Cosigned By    Initials Name Provider Type     Nury Altman, RN Registered Nurse    MS John Harding, PT Physical Therapist    ESTELLA Christensen, RN Registered Nurse     Iman Webb, MS CCC-SLP Speech and Language Pathologist    KHALIF Allen, BEBE Registered Nurse          Physical Therapy Education     Title: PT OT SLP Therapies (Active)     Topic: Physical Therapy (Active)     Point: Mobility training (Active)    Learning Progress Summary    Learner Readiness Method Response Comment Documented by Status   Patient Nonacceptance E,D NR,NL  MS 03/08/18 1107 Active               Point: Home exercise program (Active)    Learning Progress Summary    Learner Readiness Method Response Comment Documented by Status   Patient Nonacceptance E,D NR,NL  MS 03/08/18 1107 Active                Point: Body mechanics (Active)    Learning Progress Summary    Learner Readiness Method Response Comment Documented by Status   Patient Nonacceptance E,D NR,NL  MS 03/08/18 1107 Active               Point: Precautions (Active)    Learning Progress Summary    Learner Readiness Method Response Comment Documented by Status   Patient Nonacceptance E,D NR,NL  MS 03/08/18 1107 Active                      User Key     Initials Effective Dates Name Provider Type Discipline    MS 12/01/15 -  John Harding, PT Physical Therapist PT                PT Recommendation and Plan  Anticipated Discharge Disposition: skilled nursing facility  Planned Therapy Interventions: balance training, bed mobility training, gait training, home exercise program, patient/family education, postural re-education, ROM (Range of Motion), strengthening, transfer training  PT Frequency: daily  Plan of Care Review  Plan Of Care Reviewed With: patient  Outcome Summary/Follow up Plan: Pt. will benefit from skilled inpt. P.T. to address her functional deficits and to assist pt. in regaining her maximum level of independence with functional mobility.  Pt.'s progress will depend on her ability to cooperate and follow commands with P.T.          IP PT Goals       03/08/18 1107          Bed Mobility PT LTG    Bed Mobility PT LTG, Date Established 03/08/18  -MS      Bed Mobility PT LTG, Time to Achieve 5 - 7 days  -MS      Bed Mobility PT LTG, Activity Type all bed mobility  -MS      Bed Mobility PT LTG, [place] Level contact guard assist  -MS      Transfer Training PT LTG    Transfer Training PT LTG, Date Established 03/08/18  -MS      Transfer Training PT LTG, Time to Achieve 5 - 7 days  -MS      Transfer Training PT LTG, Activity Type all transfers  -MS      Transfer Training PT LTG, [place] Level contact guard assist;2 person assist required  -MS      Transfer Training PT LTG, Assist Device walker, rolling  -MS      Gait Training PT  LTG    Gait Training Goal PT LTG, Date Established 03/08/18  -MS      Gait Training Goal PT LTG, Time to Achieve 5 - 7 days  -MS      Gait Training Goal PT LTG, [place] Level contact guard assist;2 person assist required  -MS      Gait Training Goal PT LTG, Assist Device walker, rolling  -MS      Gait Training Goal PT LTG, Distance to Achieve 20 feet  -MS        User Key  (r) = Recorded By, (t) = Taken By, (c) = Cosigned By    Initials Name Provider Type    MS John Harding PT Physical Therapist                Outcome Measures       03/08/18 1100          How much help from another person do you currently need...    Turning from your back to your side while in flat bed without using bedrails? 2  -MS      Moving from lying on back to sitting on the side of a flat bed without bedrails? 2  -MS      Moving to and from a bed to a chair (including a wheelchair)? 2  -MS      Standing up from a chair using your arms (e.g., wheelchair, bedside chair)? 2  -MS      Climbing 3-5 steps with a railing? 2  -MS      To walk in hospital room? 2  -MS      AM-PAC 6 Clicks Score 12  -MS      Functional Assessment    Outcome Measure Options AM-PAC 6 Clicks Basic Mobility (PT)  -MS        User Key  (r) = Recorded By, (t) = Taken By, (c) = Cosigned By    Initials Name Provider Type    MS John Harding PT Physical Therapist           Time Calculation:         PT Charges       03/08/18 1110          Time Calculation    Start Time 1040  -MS      Stop Time 1058  -MS      Time Calculation (min) 18 min  -MS      PT Received On 03/08/18  -MS      PT - Next Appointment 03/09/18  -MS      PT Goal Re-Cert Due Date 03/15/18  -MS        User Key  (r) = Recorded By, (t) = Taken By, (c) = Cosigned By    Initials Name Provider Type    MS John Harding PT Physical Therapist          Therapy Charges for Today     Code Description Service Date Service Provider Modifiers Qty    59251505601  PT EVAL LOW COMPLEXITY 1 3/8/2018 John CARTER  Meaghan, PT GP 1    56956870792 HC PT THER SUPP EA 15 MIN 3/8/2018 John Harding, PT GP 1          PT G-Codes  Outcome Measure Options: AM-PAC 6 Clicks Basic Mobility (PT)      John Harding, PT  3/8/2018

## 2018-03-08 NOTE — CONSULTS
82 y/o  mother of 3 adult children admitted to the hospital due to hip fracture.  She has been a resident at [facility] since 12/2017.  Patient is confused and irritable. Daughter states that's because she is tired.  [facility] and Dr. Chiang have been consulted due to her dementia.  Patient has severe dementia.  She does not know how to find the commode or know what a commode is any longer thus per daughter she slipped on her own urine.  Per daugther patient had been living alone until 12/2017.  Patient has had significant decline cognitively. She had been tried on Naminda and family did not want to delay the process any longer to help her cope better thinking that if she wasn't in that period where she knew she was failing and was upset about it.  She has been seen by the psychiatrist that comes to the [facility]. She had also been on Klonopin in the past.  Family felt that pt had difficulty sleeping as well.      Patient was not in the mood to talk.  Per daughter when she gets tired she gets irritable.      Dr. Chiang will consult on patient in the AM.

## 2018-03-08 NOTE — PLAN OF CARE
Problem: Patient Care Overview (Adult)  Goal: Plan of Care Review  Outcome: Ongoing (interventions implemented as appropriate)   03/08/18 1107 03/08/18 3879   Coping/Psychosocial Response Interventions   Plan Of Care Reviewed With patient --    Patient Care Overview   Progress --  progress toward functional goals is gradual   Outcome Evaluation   Outcome Summary/Follow up Plan --  Pt pod 1 right hip nailing. Dressing CDI, vitals WNL. Pt is very confused, baseline dementia. Pt stood with PT and sat up in chair. Pain controlled with PO meds. Will continue to monitor BP r/t history of HTN.     Goal: Adult Individualization and Mutuality  Outcome: Ongoing (interventions implemented as appropriate)    Goal: Discharge Needs Assessment  Outcome: Ongoing (interventions implemented as appropriate)      Problem: Pressure Ulcer Risk (Dwain Scale) (Adult,Obstetrics,Pediatric)  Goal: Skin Integrity  Outcome: Ongoing (interventions implemented as appropriate)      Problem: Perioperative Period (Adult)  Goal: Signs and Symptoms of Listed Potential Problems Will be Absent or Manageable (Perioperative Period)  Outcome: Ongoing (interventions implemented as appropriate)

## 2018-03-09 NOTE — PROGRESS NOTES
Name: Vanna Gardner ADMIT: 3/6/2018   : 1934  PCP: Jenny Vazquez MD    MRN: [id number] LOS: 3 days   AGE/SEX: 83 y.o. female  ROOM: [facility]   Subjective   Subjective  Discussed with family at bedside and they would like to get her back to usual surroundings today if able. She is awake and pleasant. Denies peripheral pain, SOA, NVD. Ate all of breakfast.    Objective   Vital Signs  Temp:  [97.6 °F (36.4 °C)-98.4 °F (36.9 °C)] 98.1 °F (36.7 °C)  Heart Rate:  [74-88] 88  Resp:  [16] 16  BP: (102-151)/(45-66) 147/58  SpO2:  [90 %-95 %] 92 %  on   ;   O2 Device: room air  Body mass index is 23.3 kg/(m^2).    Physical Exam   Constitutional: She appears well-developed. No distress.   HENT:   Head: Normocephalic and atraumatic.   Eyes: EOM are normal. Pupils are equal, round, and reactive to light.   Neck: Normal range of motion. Neck supple.   Cardiovascular: Normal rate, regular rhythm and intact distal pulses.    Murmur heard.  Pulmonary/Chest: Effort normal and breath sounds normal. She has no wheezes. She has no rales.   Abdominal: Soft. She exhibits no distension.   Musculoskeletal: She exhibits no edema.   Neurological: She is alert.   Oriented to person   Skin: Skin is warm and dry. She is not diaphoretic.   Psychiatric: She has a normal mood and affect. Cognition and memory are impaired.   Redirectable.   Nursing note and vitals reviewed.      Results Review:       I reviewed the patient's new clinical results.    Results from last 7 days  Lab Units 18  0407 18  0403 18  0351 18  0940   WBC 10*3/mm3  --   --  9.71 12.91*   HEMOGLOBIN g/dL 11.8* 13.0 13.4 14.0   PLATELETS 10*3/mm3  --   --  199 228     Results from last 7 days  Lab Units 18  0407 18  0351 18  0940   SODIUM mmol/L 146* 141 143   POTASSIUM mmol/L 3.9 3.8  3.8 3.4*   CHLORIDE mmol/L 108* 104 103   CO2 mmol/L 27.3 26.7 29.1*   BUN mg/dL 13 7* 8   CREATININE mg/dL 0.56* 0.45* 0.47*   GLUCOSE  mg/dL 97 101* 136*   Estimated Creatinine Clearance: 53.4 mL/min (by C-G formula based on Cr of 0.56).  Results from last 7 days  Lab Units 03/09/18  0407 03/07/18  0351 03/06/18  0940   CALCIUM mg/dL 8.6 9.0 9.0   ALBUMIN g/dL  --  3.20* 3.90   MAGNESIUM mg/dL  --  1.7  --          amLODIPine 10 mg Oral Daily   aspirin 81 mg Oral BID   atorvastatin 10 mg Oral Nightly   clonazePAM 0.5 mg Oral Nightly   docusate sodium 100 mg Oral BID   mirtazapine 15 mg Oral Nightly   polyethylene glycol 17 g Oral BID      Diet Regular      Assessment/Plan   Active Hospital Problems (** Indicates Principal Problem)    Diagnosis Date Noted   • **Closed fracture of right hip [S72.001A] 03/06/2018   • Alzheimer's disease [G30.9] 03/06/2018   • Aortic valve stenosis [I35.0] 11/30/2017   • Essential hypertension [I10] 09/26/2013      Resolved Hospital Problems    Diagnosis Date Noted Date Resolved   No resolved problems to display.     - Closed Right Hip Fracture: sp right hip trochanteric fermoral nail 03/07. Continue pain control and bowel regimen. ASA for PPx per orthopedic orders. Orthopedic surgery following.  - Aortic Stenosis: Moderate on prior Echo. Cardiology following.  - HTN: Stable.   - Dementia: Clonazepam, Olanzapine PRN. Psychiatry consulted, to evaluate today.  - Disposition: SNF/precert pending, medically stable for discharge once precert obtained    Christian Miller MD  [facility]  03/09/18  8:17 AM

## 2018-03-09 NOTE — THERAPY TREATMENT NOTE
Acute Care - Physical Therapy Treatment Note  [facility]     Patient Name: Vanna Gardner  : 1934  MRN: [id number]  Today's Date: 3/9/2018  Onset of Illness/Injury or Date of Surgery Date: 18  Date of Referral to PT: 18  Referring Physician: Darin Espinal    Admit Date: 3/6/2018    Visit Dx:    ICD-10-CM ICD-9-CM   1. Closed fracture of left hip, initial encounter S72.002A 820.8   2. Severe dementia F03.90 294.20   3. Contusion of scalp, initial encounter S00.03XA 920   4. Difficulty walking R26.2 719.7     Patient Active Problem List   Diagnosis   • Aortic stenosis   • Benign essential HTN   • Essential hypertension   • Aortic valve stenosis   • Closed fracture of right hip   • Alzheimer's disease               Adult Rehabilitation Note       18 1206          Rehab Assessment/Intervention    Discipline physical therapy assistant  -      Document Type therapy note (daily note)  -      Subjective Information agree to therapy  -      Symptoms Noted Comment very confused-[facility] reports that is her usual state  -      Precautions/Limitations fall precautions  -      Recorded by [JM] Nora Kaiser PTA      Pain Assessment    Pain Assessment Unable to assess  -      Foster-Ibarra FACES Pain Rating --   says no pain, but clearly in pain w/mvmnt  -      Pain Location Hip  -      Pain Orientation Right  -      Recorded by [JM] Nora Kaiser PTA      Bed Mobility, Assessment/Treatment    Bed Mob, Supine to Sit, [place] 2 person assist required;moderate assist (50% patient effort)  -      Bed Mobility, Safety Issues cognitive deficits limit understanding;decreased use of legs for bridging/pushing;decreased use of arms for pushing/pulling  -      Bed Mobility, Impairments strength decreased;pain  -      Recorded by [] Nora Kaiser PTA      Transfer Assessment/Treatment    Transfers, Sit-Stand [place] 2 person assist required;minimum assist (75% patient  effort)  -JM      Transfers, Stand-Sit [place] 2 person assist required;minimum assist (75% patient effort);verbal cues required;nonverbal cues required (demo/gesture)   pt did not understand to sit down  -JM      Transfers, Sit-Stand-Sit, Assist Device rolling walker  -JM      Transfer, Comment constant cues to keep somewhat on task  -JM      Recorded by [ZACHARY] Nora Kaiser PTA      Gait Assessment/Treatment    Gait, [place] Level 2 person assist required;minimum assist (75% patient effort);verbal cues required;nonverbal cues required (demo/gesture)  -JM      Gait, Assistive Device rolling walker  -JM      Gait, Distance (Feet) 12  -JM      Gait, Gait Deviations antalgic;nawaf decreased;forward flexed posture;step length decreased;narrow base  -JM      Gait, Comment constant cues and assist to move wx  -JM      Recorded by [BISI Kaiser PTA      Therapy Exercises    Exercise Protocols hip ORIF  -JM      Hip ORIF Exercises right:;15 reps;with assist;heel slides;hip abduction   APs indep  -JM      Recorded by [ZACHARY] Nora Kaiser PTA      Positioning and Restraints    Pre-Treatment Position in bed  -JM      In Chair reclined;with family/caregiver;with nsg   nsg to reactivate alarm  -JM      Recorded by [ZACHARY] Nora Kaiser PTA        User Key  (r) = Recorded By, (t) = Taken By, (c) = Cosigned By    Initials Name Effective Dates    ZACHARY Kaiser PTA 03/07/18 -                 IP PT Goals       03/08/18 1107          Bed Mobility PT LTG    Bed Mobility PT LTG, Date Established 03/08/18  -MS      Bed Mobility PT LTG, Time to Achieve 5 - 7 days  -MS      Bed Mobility PT LTG, Activity Type all bed mobility  -MS      Bed Mobility PT LTG, [place] Level contact guard assist  -MS      Transfer Training PT LTG    Transfer Training PT LTG, Date Established 03/08/18  -MS      Transfer Training PT LTG, Time to Achieve 5 - 7 days  -MS      Transfer Training PT LTG, Activity Type all transfers   -MS      Transfer Training PT LTG, [place] Level contact guard assist;2 person assist required  -MS      Transfer Training PT LTG, Assist Device walker, rolling  -MS      Gait Training PT LTG    Gait Training Goal PT LTG, Date Established 03/08/18  -MS      Gait Training Goal PT LTG, Time to Achieve 5 - 7 days  -MS      Gait Training Goal PT LTG, [place] Level contact guard assist;2 person assist required  -MS      Gait Training Goal PT LTG, Assist Device walker, rolling  -MS      Gait Training Goal PT LTG, Distance to Achieve 20 feet  -MS        User Key  (r) = Recorded By, (t) = Taken By, (c) = Cosigned By    Initials Name Provider Type    MS John Harding, NEEL Physical Therapist          Physical Therapy Education     Title: PT OT SLP Therapies (Active)     Topic: Physical Therapy (Active)     Point: Mobility training (Active)    Learning Progress Summary    Learner Readiness Method Response Comment Documented by Status   Patient Nonacceptance E,D NR,NL  MS 03/08/18 1107 Active               Point: Home exercise program (Active)    Learning Progress Summary    Learner Readiness Method Response Comment Documented by Status   Patient Nonacceptance E,D NR,NL  MS 03/08/18 1107 Active               Point: Body mechanics (Active)    Learning Progress Summary    Learner Readiness Method Response Comment Documented by Status   Patient Nonacceptance E,D NR,NL  MS 03/08/18 1107 Active               Point: Precautions (Active)    Learning Progress Summary    Learner Readiness Method Response Comment Documented by Status   Patient Nonacceptance E,D NR,NL  MS 03/08/18 1107 Active                      User Key     Initials Effective Dates Name Provider Type Discipline    MS 12/01/15 -  John Harding PT Physical Therapist PT                    PT Recommendation and Plan  Anticipated Discharge Disposition: skilled nursing facility  Planned Therapy Interventions: balance training, bed mobility training, gait  training, home exercise program, patient/family education, postural re-education, ROM (Range of Motion), strengthening, transfer training  PT Frequency: daily             Outcome Measures       03/09/18 1300 03/08/18 1100       How much help from another person do you currently need...    Turning from your back to your side while in flat bed without using bedrails? 3  -JM 2  -MS     Moving from lying on back to sitting on the side of a flat bed without bedrails? 2  -JM 2  -MS     Moving to and from a bed to a chair (including a wheelchair)? 2  -JM 2  -MS     Standing up from a chair using your arms (e.g., wheelchair, bedside chair)? 2  -JM 2  -MS     Climbing 3-5 steps with a railing? 1  -JM 2  -MS     To walk in hospital room? 3  -JM 2  -MS     AM-PAC 6 Clicks Score 13  -JM 12  -MS     Functional Assessment    Outcome Measure Options  AM-PAC 6 Clicks Basic Mobility (PT)  -MS       User Key  (r) = Recorded By, (t) = Taken By, (c) = Cosigned By    Initials Name Provider Type    ZACHARY Kaiser PTA Physical Therapy Assistant    MS John EMMA Harding, PT Physical Therapist           Time Calculation:         PT Charges       03/09/18 1159          Time Calculation    Start Time 1128  -      Stop Time 1156  -      Time Calculation (min) 28 min  -      PT Received On 03/09/18  -ZACHARY      PT - Next Appointment 03/10/18  -ZACHARY        User Key  (r) = Recorded By, (t) = Taken By, (c) = Cosigned By    Initials Name Provider Type    ZACHARY Kaiser PTA Physical Therapy Assistant          Therapy Charges for Today     Code Description Service Date Service Provider Modifiers Qty    [id number]  PT THER PROC EA 15 MIN 3/9/2018 Nora Kaiser PTA GP 2          PT G-Codes  Outcome Measure Options: AM-PAC 6 Clicks Basic Mobility (PT)    Nora Kaiser PTA  3/9/2018

## 2018-03-09 NOTE — PLAN OF CARE
Problem: Fall Risk (Adult)  Goal: Absence of Falls  Outcome: Ongoing (interventions implemented as appropriate)      Problem: Patient Care Overview (Adult)  Goal: Plan of Care Review  Outcome: Ongoing (interventions implemented as appropriate)   03/09/18 0340   Coping/Psychosocial Response Interventions   Plan Of Care Reviewed With patient   Patient Care Overview   Progress improving   Outcome Evaluation   Outcome Summary/Follow up Plan pain controlled during shift with PO pain meds; agitated early in night, but then slept most of the night; very confused; picks at dressing- reinforced with gauze and tegaderm; vss; incontinent- brief in place; q 2 turns- refuses at times and turns herself in bed; unable to educate pt on comorbidities due to severe dementia; plan to d/c back to [facility] in AM     Goal: Adult Individualization and Mutuality  Outcome: Ongoing (interventions implemented as appropriate)      Problem: Pressure Ulcer Risk (Dwain Scale) (Adult,Obstetrics,Pediatric)  Goal: Skin Integrity  Outcome: Ongoing (interventions implemented as appropriate)      Problem: Perioperative Period (Adult)  Goal: Signs and Symptoms of Listed Potential Problems Will be Absent or Manageable (Perioperative Period)  Outcome: Ongoing (interventions implemented as appropriate)

## 2018-03-09 NOTE — PROGRESS NOTES
Orthopedic Progress Note      Patient: Vanna Gardner    YOB: 1934    Medical Record Number: [id number]    Attending Physician: Christian Miller MD    Date of Admission: 3/6/2018  9:04 AM    Admitting Dx:  Hip fracture [S72.009A]  Severe dementia [F03.90]  Closed fracture of left hip, initial encounter [S72.002A]    Status Post: HIP INTERTROCHANTERIC NAILING    Post Operative Day Number: 2    Current Problem List:   Patient Active Problem List   Diagnosis   • Aortic stenosis   • Benign essential HTN   • Essential hypertension   • Aortic valve stenosis   • Closed fracture of right hip   • Alzheimer's disease         Past Medical History:   Diagnosis Date   • Dementia    • Hypertension        SUBJECTIVE: 83 y.o.  female. More awake today and cooperative.  Remains oriented to name only    OBJECTIVE:   Vitals:    03/08/18 2243 03/09/18 0430 03/09/18 0700 03/09/18 1100   BP: 151/66 138/58 147/58 131/66   BP Location: Right arm Right arm Right arm Right arm   Patient Position: Lying Lying Sitting Lying   Pulse: 83 74 88 77   Resp: 16 16 16 16   Temp: 98.4 °F (36.9 °C)  98.1 °F (36.7 °C)    TempSrc: Oral  Oral    SpO2: 93% 90% 92% 93%   Weight:       Height:         I/O last 3 completed shifts:  In: 400 [P.O.:200; I.V.:200]  Out: 700 [Urine:700]    Current Medications:  Scheduled Meds:  amLODIPine 10 mg Oral Daily   aspirin 81 mg Oral BID   atorvastatin 10 mg Oral Nightly   docusate sodium 100 mg Oral BID   mirtazapine 15 mg Oral Nightly   polyethylene glycol 17 g Oral BID     PRN Meds:.•  acetaminophen **OR** acetaminophen **OR** acetaminophen  •  acetaminophen  •  clonazePAM  •  Glycerin-Hypromellose-  •  HYDROcodone-acetaminophen  •  HYDROcodone-acetaminophen  •  OLANZapine  •  ondansetron **OR** ondansetron ODT **OR** ondansetron    Diagnostic Tests:   Lab Results (last 24 hours)     Procedure Component Value Units Date/Time    Hemoglobin & Hematocrit, Blood [[id number]]  (Abnormal)  Collected:  03/09/18 0407    Specimen:  Blood Updated:  03/09/18 0434     Hemoglobin 11.8 (L) g/dL      Hematocrit 37.4 %     Basic Metabolic Panel [[id number]]  (Abnormal) Collected:  03/09/18 0407    Specimen:  Blood Updated:  03/09/18 0516     Glucose 97 mg/dL      BUN 13 mg/dL      Creatinine 0.56 (L) mg/dL      Sodium 146 (H) mmol/L      Potassium 3.9 mmol/L      Chloride 108 (H) mmol/L      CO2 27.3 mmol/L      Calcium 8.6 mg/dL      eGFR Non African Amer 103 mL/min/1.73      BUN/Creatinine Ratio 23.2     Anion Gap 10.7 mmol/L     Narrative:       The MDRD GFR formula is only valid for adults with stable renal function between ages 18 and 70.          PHYSICAL EXAM:  Dressing to right hip and thigh dry and intact.  Moderate swelling to right thigh, but calf soft and nontender.  Good motion and sensation to right foot and ankle.  Appears comfortable.  Pain with any movement.      ASSESSMENT & PLAN:  Continue to work with PT.  Plan to go to rehab at [place] [facility] [place] anytime from Orthopedic standpoint          Date: 3/9/2018    Whitney Montilla RN

## 2018-03-09 NOTE — PROGRESS NOTES
Continued Stay Note  [facility]     Patient Name: Vanna Gardner  MRN: [id number]  Today's Date: 3/9/2018    Admit Date: 3/6/2018          Discharge Plan       03/09/18 1004    Case Management/Social Work Plan    Plan return to [facility] ([place] cert PENDING for skilled bed)     Additional Comments [facility] cert is PENDING for pt to return to [facility] in a skilled bed. Tonya zaragoza/ [facility] is following, transfer packet in chart.               Discharge Codes     None            Cat Navarro RN

## 2018-03-09 NOTE — CONSULTS
IDENTIFYING INFORMATION: The patient is an 83-year-old white female admitted after she had suffered a hip fracture following a fall.  She has a history of late stage Alzheimer's disease and is a resident of [facility].    CHIEF COMPLAINT:  None given    INFORMANT:  Family and chart    RELIABILITY:  Good    HISTORY OF PRESENT ILLNESS: The patient is an 83-year-old white female admitted on 3/6/2018 after she had suffered a fall fracturing her right hip.  The patient has been diagnosed with dementia for the past 2 years and is resident of the [facility].  She has been seen by a psychiatrist at that facility and is currently prescribed clonazepam and Remeron.  The patient's daughter reports that the patient has seemed a bit more anxious but is better today after sleeping well last evening.  The patient is pleasantly confused and offers little during attempted interview providing nonsensical answers to questions posed.  The patient's daughter reports that the patient's mood did seem to worsen after she had first moved in from the [facility] but that she has seemed calm her sense.  She does report that the patient is responded well to clonazepam.  The patient's had been on Namenda but this medication was discontinued per family wishes.    PAST PSYCHIATRIC HISTORY: As above    PAST MEDICAL HISTORY:  The patient suffers from hypertension and dyslipidemia as well as the aforementioned fractured hip.    MEDICATIONS: Amlodipine, aspirin, Lipitor, Colace, Remeron, polyethylene glycol, Klonopin, hydrocodone field    ALLERGIES:  Codeine, hydrochlorothiazide, latex, penicillin, nickel    FAMILY HISTORY:  None reported    SOCIAL HISTORY: The patient is a resident of the [facility].  There is no reported use of alcohol tobacco or street drugs.    MENTAL STATUS EXAM: The patient is an elderly white female who is a bed.  She is awake and alert but oriented to person only.  Speech is impoverished and nonsensical.  The patient  does not comply with formal testing of memory or cognition but but appears to be in a late stage of Alzheimer's disease.    ASSETS/LIABILITIES: To be assessed    DIAGNOSTIC IMPRESSION: Primary dementia Alzheimer's type, depressive disorder unspecified, anxiety disorder unspecified, status post fractured hip, dyslipidemia, hypertension    PLAN:  I will increase the frequency of as needed clonazepam to address any symptoms of anxiety and will also leave an order for intramuscular as needed Zyprexa should the patient exhibit any agitation.  I would also recommend continuation of mirtazapine.  I have spoken with the family and they did not wish for further measures to be taken.    Thank you very much for the opportunity to see this patient.

## 2018-03-09 NOTE — PLAN OF CARE
Problem: Fall Risk (Adult)  Goal: Absence of Falls  Outcome: Ongoing (interventions implemented as appropriate)      Problem: Fractured Hip (Adult)  Goal: Signs and Symptoms of Listed Potential Problems Will be Absent or Manageable (Fractured Hip)  Outcome: Ongoing (interventions implemented as appropriate)      Problem: Patient Care Overview (Adult)  Goal: Plan of Care Review  Outcome: Ongoing (interventions implemented as appropriate)   03/09/18 1450   Coping/Psychosocial Response Interventions   Plan Of Care Reviewed With patient;family   Patient Care Overview   Progress improving   Outcome Evaluation   Outcome Summary/Follow up Plan pain under control with pain medication and up with assist of 2. patient and family educated about htn and htn medications      03/09/18 1450   Coping/Psychosocial Response Interventions   Plan Of Care Reviewed With patient;family   Patient Care Overview   Progress improving   Outcome Evaluation   Outcome Summary/Follow up Plan pain under control with pain medication and up with assist of 2. patient and family educated about htn and htn medications     Goal: Adult Individualization and Mutuality  Outcome: Ongoing (interventions implemented as appropriate)    Goal: Discharge Needs Assessment  Outcome: Ongoing (interventions implemented as appropriate)      Problem: Pressure Ulcer Risk (Dwain Scale) (Adult,Obstetrics,Pediatric)  Goal: Skin Integrity  Outcome: Ongoing (interventions implemented as appropriate)      Problem: Perioperative Period (Adult)  Goal: Signs and Symptoms of Listed Potential Problems Will be Absent or Manageable (Perioperative Period)  Outcome: Ongoing (interventions implemented as appropriate)

## 2018-03-09 NOTE — PLAN OF CARE
Problem: Patient Care Overview (Adult)  Goal: Plan of Care Review  Outcome: Ongoing (interventions implemented as appropriate)   03/09/18 1349   Coping/Psychosocial Response Interventions   Plan Of Care Reviewed With patient;family   Patient Care Overview   Progress improving   Outcome Evaluation   Outcome Summary/Follow up Plan pt incr amb dist, but still having difficulty w/cues for activity due to confusion at baseline

## 2018-03-10 NOTE — PLAN OF CARE
Problem: Skin Injury Risk (Adult)  Goal: Identify Related Risk Factors and Signs and Symptoms  Outcome: Outcome(s) achieved Date Met: 03/10/18

## 2018-03-10 NOTE — PLAN OF CARE
Problem: Patient Care Overview  Goal: Plan of Care Review  Outcome: Outcome(s) achieved Date Met: 03/10/18   03/10/18 1126   Coping/Psychosocial   Plan of Care Reviewed With patient;daughter   Plan of Care Review   Progress improving   OTHER   Outcome Summary PT is POD 3 Right hip nailing. She is making progress and increasing in ambulation distance. She requires assistance with ADL's. Complains of minimal pain that is being controlled with PO Tylenol. She is to be discharged today to rehab transported by her daughter. Hx of HTnN VSS and educated on the importance of BP monitoring.     Goal: Individualization and Mutuality  Outcome: Outcome(s) achieved Date Met: 03/10/18    Goal: Discharge Needs Assessment  Outcome: Outcome(s) achieved Date Met: 03/10/18    Goal: Interprofessional Rounds/Family Conf  Outcome: Outcome(s) achieved Date Met: 03/10/18      Problem: Skin Injury Risk (Adult)  Goal: Skin Health and Integrity  Outcome: Outcome(s) achieved Date Met: 03/10/18

## 2018-03-10 NOTE — PLAN OF CARE
Problem: Fall Risk (Adult)  Goal: Identify Related Risk Factors and Signs and Symptoms  Outcome: Outcome(s) achieved Date Met: 03/10/18

## 2018-03-10 NOTE — PROGRESS NOTES
Orthopedic Progress Note      Patient: Vanna Gardner  Date of Admission: 3/6/2018  YOB: 1934  Medical Record Number: [id number]    POD # :  3 Days Post-Op Procedure(s) (LRB):  RIGHT HIP INTERTROCHANTERIC NAILING (Right)    Systemic or Specific Complaints: No Complaints    Pain Relief: some relief    Physical Exam:  83 y.o.  female  Vitals:  Temp:  [98 °F (36.7 °C)-98.6 °F (37 °C)] 98 °F (36.7 °C)  Heart Rate:  [64-80] 70  Resp:  [16] 16  BP: (109-131)/(59-66) 129/60  demented  Chest: Clear to auscultation  CV: Regular Rate and Rhythm  Abd: Soft, NT, with BS +  Ext: NV intact. ROM appropriate. Calf is soft and nontender. Negative Homans Sn  Skin: Incision clean dry and intact w/out signs or  symptoms of infection.    Activity: Mobilizing Per P.T.   Weight Bearing: As Tolerated    Data Review     Admission on 03/06/2018   Component Date Value Ref Range Status   • Extra Tube 03/06/2018 hold for add-on   Final   • Extra Tube 03/06/2018 Hold for add-ons.   Final   • Extra Tube 03/06/2018 hold for add-on   Final   • Extra Tube 03/06/2018 Hold for add-ons.   Final   • Glucose 03/06/2018 136* 65 - 99 mg/dL Final   • BUN 03/06/2018 8  8 - 23 mg/dL Final   • Creatinine 03/06/2018 0.47* 0.57 - 1.00 mg/dL Final   • Sodium 03/06/2018 143  136 - 145 mmol/L Final   • Potassium 03/06/2018 3.4* 3.5 - 5.2 mmol/L Final   • Chloride 03/06/2018 103  98 - 107 mmol/L Final   • CO2 03/06/2018 29.1* 22.0 - 29.0 mmol/L Final   • Calcium 03/06/2018 9.0  8.6 - 10.5 mg/dL Final   • Total Protein 03/06/2018 6.4  6.0 - 8.5 g/dL Final   • Albumin 03/06/2018 3.90  3.50 - 5.20 g/dL Final   • ALT (SGPT) 03/06/2018 34* 1 - 33 U/L Final   • AST (SGOT) 03/06/2018 42* 1 - 32 U/L Final   • Alkaline Phosphatase 03/06/2018 73  39 - 117 U/L Final   • Total Bilirubin 03/06/2018 0.6  0.1 - 1.2 mg/dL Final   • eGFR Non African Amer 03/06/2018 127  >60 mL/min/1.73 Final   • Globulin 03/06/2018 2.5  gm/dL Final   • A/G Ratio 03/06/2018 1.6   g/dL Final   • BUN/Creatinine Ratio 03/06/2018 17.0  7.0 - 25.0 Final   • Anion Gap 03/06/2018 10.9  mmol/L Final   • Protime 03/06/2018 13.2  11.7 - 14.2 Seconds Final   • INR 03/06/2018 1.02  0.90 - 1.10 Final   • Color, UA 03/06/2018 Yellow  Yellow, Straw Final   • Appearance, UA 03/06/2018 Clear  Clear Final   • pH, UA 03/06/2018 7.0  5.0 - 8.0 Final   • Specific Gravity, UA 03/06/2018 1.013  1.005 - 1.030 Final   • Glucose, UA 03/06/2018 Negative  Negative Final   • Ketones, UA 03/06/2018 15 mg/dL (1+)* Negative Final   • Bilirubin, UA 03/06/2018 Negative  Negative Final   • Blood, UA 03/06/2018 Negative  Negative Final   • Protein, UA 03/06/2018 Negative  Negative Final   • Leuk Esterase, UA 03/06/2018 Negative  Negative Final   • Nitrite, UA 03/06/2018 Negative  Negative Final   • Urobilinogen, UA 03/06/2018 1.0 E.U./dL  0.2 - 1.0 E.U./dL Final   • WBC 03/06/2018 12.91* 4.50 - 10.70 10*3/mm3 Final   • RBC 03/06/2018 4.34  3.90 - 5.20 10*6/mm3 Final   • Hemoglobin 03/06/2018 14.0  11.9 - 15.5 g/dL Final   • Hematocrit 03/06/2018 41.6  35.6 - 45.5 % Final   • MCV 03/06/2018 95.9  80.5 - 98.2 fL Final   • MCH 03/06/2018 32.3* 26.9 - 32.0 pg Final   • MCHC 03/06/2018 33.7  32.4 - 36.3 g/dL Final   • RDW 03/06/2018 13.3* 11.7 - 13.0 % Final   • RDW-SD 03/06/2018 46.4  37.0 - 54.0 fl Final   • MPV 03/06/2018 11.3  6.0 - 12.0 fL Final   • Platelets 03/06/2018 228  140 - 500 10*3/mm3 Final   • Neutrophil % 03/06/2018 84.1* 42.7 - 76.0 % Final   • Lymphocyte % 03/06/2018 6.3* 19.6 - 45.3 % Final   • Monocyte % 03/06/2018 9.3  5.0 - 12.0 % Final   • Eosinophil % 03/06/2018 0.0* 0.3 - 6.2 % Final   • Basophil % 03/06/2018 0.1  0.0 - 1.5 % Final   • Immature Grans % 03/06/2018 0.2  0.0 - 0.5 % Final   • Neutrophils, Absolute 03/06/2018 10.86* 1.90 - 8.10 10*3/mm3 Final   • Lymphocytes, Absolute 03/06/2018 0.81* 0.90 - 4.80 10*3/mm3 Final   • Monocytes, Absolute 03/06/2018 1.20  0.20 - 1.20 10*3/mm3 Final   •  Eosinophils, Absolute 03/06/2018 0.00  0.00 - 0.70 10*3/mm3 Final   • Basophils, Absolute 03/06/2018 0.01  0.00 - 0.20 10*3/mm3 Final   • Immature Grans, Absolute 03/06/2018 0.03  0.00 - 0.03 10*3/mm3 Final   • ABO Type 03/06/2018 O   Final   • RH type 03/06/2018 Positive   Final   • Antibody Screen 03/06/2018 Negative   Final   • WBC 03/07/2018 9.71  4.50 - 10.70 10*3/mm3 Final   • RBC 03/07/2018 4.25  3.90 - 5.20 10*6/mm3 Final   • Hemoglobin 03/07/2018 13.4  11.9 - 15.5 g/dL Final   • Hematocrit 03/07/2018 41.9  35.6 - 45.5 % Final   • MCV 03/07/2018 98.6* 80.5 - 98.2 fL Final   • MCH 03/07/2018 31.5  26.9 - 32.0 pg Final   • MCHC 03/07/2018 32.0* 32.4 - 36.3 g/dL Final   • RDW 03/07/2018 13.6* 11.7 - 13.0 % Final   • RDW-SD 03/07/2018 48.5  37.0 - 54.0 fl Final   • MPV 03/07/2018 10.3  6.0 - 12.0 fL Final   • Platelets 03/07/2018 199  140 - 500 10*3/mm3 Final   • Neutrophil % 03/07/2018 68.0  42.7 - 76.0 % Final   • Lymphocyte % 03/07/2018 16.8* 19.6 - 45.3 % Final   • Monocyte % 03/07/2018 14.3* 5.0 - 12.0 % Final   • Eosinophil % 03/07/2018 0.6  0.3 - 6.2 % Final   • Basophil % 03/07/2018 0.3  0.0 - 1.5 % Final   • Immature Grans % 03/07/2018 0.0  0.0 - 0.5 % Final   • Neutrophils, Absolute 03/07/2018 6.60  1.90 - 8.10 10*3/mm3 Final   • Lymphocytes, Absolute 03/07/2018 1.63  0.90 - 4.80 10*3/mm3 Final   • Monocytes, Absolute 03/07/2018 1.39* 0.20 - 1.20 10*3/mm3 Final   • Eosinophils, Absolute 03/07/2018 0.06  0.00 - 0.70 10*3/mm3 Final   • Basophils, Absolute 03/07/2018 0.03  0.00 - 0.20 10*3/mm3 Final   • Immature Grans, Absolute 03/07/2018 0.00  0.00 - 0.03 10*3/mm3 Final   • Glucose 03/07/2018 101* 65 - 99 mg/dL Final   • BUN 03/07/2018 7* 8 - 23 mg/dL Final   • Creatinine 03/07/2018 0.45* 0.57 - 1.00 mg/dL Final   • Sodium 03/07/2018 141  136 - 145 mmol/L Final   • Potassium 03/07/2018 3.8  3.5 - 5.2 mmol/L Final   • Chloride 03/07/2018 104  98 - 107 mmol/L Final   • CO2 03/07/2018 26.7  22.0 - 29.0  mmol/L Final   • Calcium 03/07/2018 9.0  8.6 - 10.5 mg/dL Final   • Total Protein 03/07/2018 5.8* 6.0 - 8.5 g/dL Final   • Albumin 03/07/2018 3.20* 3.50 - 5.20 g/dL Final   • ALT (SGPT) 03/07/2018 32  1 - 33 U/L Final   • AST (SGOT) 03/07/2018 43* 1 - 32 U/L Final   • Alkaline Phosphatase 03/07/2018 67  39 - 117 U/L Final   • Total Bilirubin 03/07/2018 0.7  0.1 - 1.2 mg/dL Final   • eGFR Non African Amer 03/07/2018 133  >60 mL/min/1.73 Final   • Globulin 03/07/2018 2.6  gm/dL Final   • A/G Ratio 03/07/2018 1.2  g/dL Final   • BUN/Creatinine Ratio 03/07/2018 15.6  7.0 - 25.0 Final   • Anion Gap 03/07/2018 10.3  mmol/L Final   • Protime 03/07/2018 14.0  11.7 - 14.2 Seconds Final   • INR 03/07/2018 1.10  0.90 - 1.10 Final   • Magnesium 03/07/2018 1.7  1.6 - 2.4 mg/dL Final   • Potassium 03/07/2018 3.8  3.5 - 5.2 mmol/L Final   • Hemoglobin 03/08/2018 13.0  11.9 - 15.5 g/dL Final   • Hematocrit 03/08/2018 41.3  35.6 - 45.5 % Final   • Hemoglobin 03/09/2018 11.8* 11.9 - 15.5 g/dL Final   • Hematocrit 03/09/2018 37.4  35.6 - 45.5 % Final   • Glucose 03/09/2018 97  65 - 99 mg/dL Final   • BUN 03/09/2018 13  8 - 23 mg/dL Final   • Creatinine 03/09/2018 0.56* 0.57 - 1.00 mg/dL Final   • Sodium 03/09/2018 146* 136 - 145 mmol/L Final   • Potassium 03/09/2018 3.9  3.5 - 5.2 mmol/L Final   • Chloride 03/09/2018 108* 98 - 107 mmol/L Final   • CO2 03/09/2018 27.3  22.0 - 29.0 mmol/L Final   • Calcium 03/09/2018 8.6  8.6 - 10.5 mg/dL Final   • eGFR Non African Amer 03/09/2018 103  >60 mL/min/1.73 Final   • BUN/Creatinine Ratio 03/09/2018 23.2  7.0 - 25.0 Final   • Anion Gap 03/09/2018 10.7  mmol/L Final   • Hemoglobin 03/10/2018 11.1* 11.9 - 15.5 g/dL Final   • Hematocrit 03/10/2018 34.3* 35.6 - 45.5 % Final   • Glucose 03/10/2018 100* 65 - 99 mg/dL Final   • BUN 03/10/2018 14  8 - 23 mg/dL Final   • Creatinine 03/10/2018 0.58  0.57 - 1.00 mg/dL Final   • Sodium 03/10/2018 142  136 - 145 mmol/L Final   • Potassium 03/10/2018 3.6   3.5 - 5.2 mmol/L Final   • Chloride 03/10/2018 105  98 - 107 mmol/L Final   • CO2 03/10/2018 27.8  22.0 - 29.0 mmol/L Final   • Calcium 03/10/2018 8.4* 8.6 - 10.5 mg/dL Final   • eGFR Non African Amer 03/10/2018 99  >60 mL/min/1.73 Final   • BUN/Creatinine Ratio 03/10/2018 24.1  7.0 - 25.0 Final   • Anion Gap 03/10/2018 9.2  mmol/L Final       No results found.    Medications:    amLODIPine 10 mg Oral Daily   aspirin 81 mg Oral BID   atorvastatin 10 mg Oral Nightly   bisacodyl 10 mg Rectal Once   docusate sodium 100 mg Oral BID   mirtazapine 15 mg Oral Nightly   polyethylene glycol 17 g Oral BID     •  acetaminophen **OR** acetaminophen **OR** acetaminophen  •  acetaminophen  •  clonazePAM  •  Glycerin-Hypromellose-  •  HYDROcodone-acetaminophen  •  HYDROcodone-acetaminophen  •  OLANZapine  •  ondansetron **OR** ondansetron ODT **OR** ondansetron    Assessment:  Doing well POD  # 3 Days Post-Op Procedure(s) (LRB):  RIGHT HIP INTERTROCHANTERIC NAILING (Right)  Problem List Items Addressed This Visit     None      Visit Diagnoses     Closed fracture of left hip, initial encounter    -  Primary    Severe dementia        Relevant Medications    mirtazapine (REMERON) 15 MG tablet    Contusion of scalp, initial encounter              Plan:  Continue efforts to mobilize- WBAT, no restrictions  Continue Pain Control Measures - I would recommend tylenol,  should use Norco only for severe breakthrough  Continue incisional Care -  Change dressings if saturated  DVT prophylaxis -  Asa 81 BID x 4 weeks    Discharge Plan:Rehab today if medicine approves  F/u Dr darin hughes in 2 -3 weeks [phone]    Darin Hughes MD    Date: 3/10/2018  Time: 9:20 AM

## 2018-03-10 NOTE — DISCHARGE SUMMARY
Date of Admission: 3/6/2018  Date of Discharge:  3/10/2018  Primary Care Physician: Jenny Vazquez MD     Discharge Diagnosis:  Active Hospital Problems (** Indicates Principal Problem)    Diagnosis Date Noted   • **Closed fracture of right hip [S72.001A] 03/06/2018   • Alzheimer's disease [G30.9] 03/06/2018   • Aortic valve stenosis [I35.0] 11/30/2017   • Essential hypertension [I10] 09/26/2013      Resolved Hospital Problems    Diagnosis Date Noted Date Resolved   No resolved problems to display.       Presenting Problem/History of Present Illness:  Hip fracture [S72.009A]  Severe dementia [F03.90]  Closed fracture of left hip, initial encounter [S72.002A]     Ms. Gardner is a 83 y.o. female with a history of dementia and moderate aortic stenosis who presents to [facility] after having unwitnessed fall at SNF and was found on ground by staff. She has severe dementia and is unable to give reliable history. She does deny any CP, palpitations, SOA, NVD. She did report pain but when asked location, she would instead report days of the week and her name. Discussed with her family in room and she confirmed that she has dementia and pulls at lines and monitors with sundowning issues at baseline. She also confirmed conditional code/dnr status.    Hospital Course:  The patient is a 83 y.o. female who presented with closed right hip fracture after fall at NH. She was admitted, pain controlled, and Dr Lim (Orthopedic Surgery) consulted. She had previous moderate aortic stenosis so Dr Bach (Cardiology) was consulted. She then underwent right hip trochanteric femoral nail on 03/07/18. She tolerated the procedure well. She has baseline dementia and resided in LTC facility. She had some worsening confusion perioperatively related to change in environment and pain. Dr Chiang (Psychiatry) was consulted. She is currently at baseline. She worked with PT and will require SNF level of care at discharge for  continued therapy. She remained in house overnight awaiting insurance precertification and family was notified of approval late last night. She can be discharged today once confirmed.    She should follow up with Orthopedics as scheduled.    Exam Today:  Constitutional: She appears well-developed. No distress.   HENT:   Head: Normocephalic and atraumatic.   Eyes: EOM are normal. Pupils are equal, round, and reactive to light.   Neck: Normal range of motion. Neck supple.   Cardiovascular: Normal rate, regular rhythm and intact distal pulses.    Murmur heard.  Pulmonary/Chest: Effort normal and breath sounds normal. She has no wheezes. She has no rales.   Abdominal: Soft. She exhibits no distension.   Musculoskeletal: She exhibits no edema.   Neurological: She is alert. Oriented to person   Skin: Skin is warm and dry. She is not diaphoretic.   Psychiatric: She has a normal mood and affect. Cognition and memory are impaired. Pleasant, Redirectable.     Procedures Performed:  Procedure(s):  RIGHT HIP INTERTROCHANTERIC NAILING       Consults:   Consults     Date and Time Order Name Status Description    3/8/2018 1044 Inpatient Psychiatrist Consult Completed     3/6/2018 1506 Inpatient Consult to Orthopedic Surgery Completed     3/6/2018 1506 Inpatient Consult to Cardiology Completed     3/6/2018 1304 [facility] (on-call MD unless specified) Completed            Discharge Disposition:  Skilled Nursing Facility (DC - External)    Discharge Medications:   Vanna Gardner [facility]   Home Medication Instructions SURENDRA:[id number]    Printed on:03/10/18 0759   Medication Information                      acetaminophen (TYLENOL) 500 MG tablet  Take 500 mg by mouth 2 (Two) Times a Day.             ACETAMINOPHEN PO  Take 500 mg by mouth Every 6 (Six) Hours As Needed.             amLODIPine (NORVASC) 10 MG tablet  Take 10 mg by mouth Daily.             Aspirin (ECOTRIN PO)  Take 81 mg by mouth.             ATORVASTATIN CALCIUM PO  Take 10 mg by  mouth.             clonazePAM (KlonoPIN) 0.5 MG tablet  Take 1 tablet by mouth 2 (Two) Times a Day As Needed for Anxiety or Seizures.             clonazePAM (KlonoPIN) 0.5 MG tablet  Take 0.5 mg by mouth Every Night.             docusate sodium 100 MG capsule  Take 100 mg by mouth 2 (Two) Times a Day for 27 doses.             HYDROcodone-acetaminophen (NORCO) 7.5-325 MG per tablet  Take 1-2 tablets by mouth Every 4 (Four) Hours As Needed for Moderate Pain  or Severe Pain .             mirtazapine (REMERON) 15 MG tablet  Take 15 mg by mouth Every Night.             Multiple Vitamins-Minerals (MULTIVITAMIN PO)  Take  by mouth.             Multiple Vitamins-Minerals (OCUVITE ADULT 50+) capsule  Take 250 mg by mouth Daily.             Omega-3 Fatty Acids (OMEGA 3 PO)  Take  by mouth Daily.             polyethyl glycol-propyl glycol (SYSTANE) 0.4-0.3 % solution ophthalmic solution  Administer 1 drop to both eyes Every 2 (Two) Hours As Needed.             polyethylene glycol (MIRALAX) pack packet  Take 17 g by mouth 2 (Two) Times a Day for 26 doses. Indications: Treatment for the Prevention of Constipation                 Discharge Diet:   Diet Instructions     Advance Diet As Tolerated             Activity at Discharge:   Activity Instructions     Activity as Tolerated             Follow-up Appointments:  Future Appointments  Date Time Provider Department Center   4/2/2018 12:50 PM Francy Tse MD [facility] None     Additional Instructions for the Follow-ups that You Need to Schedule     Discharge Follow-up with PCP    As directed      Follow Up Details:  1-2 weeks         Discharge Follow-up with Specified Provider: Dr Lim, Orthopedic Surgery    As directed      To:  Dr Lim, Orthopedic Surgery    Follow Up Details:  as scheduled               Test Results Pending at Discharge:       Christian Miller MD  03/10/18  7:59 AM    Time Spent on Discharge Activities: >30 minutes

## (undated) DEVICE — VITAL SIGNS™ ADULT ANESTHESIA BREATHING CIRCUIT: Brand: VITAL SIGNS™

## (undated) DEVICE — DRSNG SURESITE WNDW 4X4.5

## (undated) DEVICE — BIT DRL 3FLUT QC NDL PT 4.2X145MM STRL

## (undated) DEVICE — INTENDED FOR TISSUE SEPARATION, AND OTHER PROCEDURES THAT REQUIRE A SHARP SURGICAL BLADE TO PUNCTURE OR CUT.: Brand: BARD-PARKER ® CARBON RIB-BACK BLADES

## (undated) DEVICE — Device

## (undated) DEVICE — SUT VIC 2/0 CT2 27IN J269H

## (undated) DEVICE — APPL DURAPREP IODOPHOR APL 26ML

## (undated) DEVICE — PK HIP PINNING 40

## (undated) DEVICE — SPNG GZ WOVN 4X4IN 12PLY 10/BX STRL

## (undated) DEVICE — STPLR SKIN VISISTAT WD 35CT

## (undated) DEVICE — ENCORE® LATEX ORTHO SIZE 7.5, STERILE LATEX POWDER-FREE SURGICAL GLOVE: Brand: ENCORE

## (undated) DEVICE — MAT FLR ABSORBENT LG 4FT 10 2.5FT

## (undated) DEVICE — GLV SURG SENSICARE MICRO PF LF 7.5 STRL

## (undated) DEVICE — GW FOR TROCH NAIL 3.2X400MM

## (undated) DEVICE — GLV SURG SENSICARE W/ALOE PF LF 7.5 STRL

## (undated) DEVICE — DRAPE,REIN 53X77,STERILE: Brand: MEDLINE

## (undated) DEVICE — UNDERCAST PADDING: Brand: DEROYAL

## (undated) DEVICE — BNDG ELAS ELITE V/CLOSE 6IN 5YD LF STRL

## (undated) DEVICE — GLV SURG SENSICARE W/ALOE PF LF 8 STRL

## (undated) DEVICE — PREMIUM WET SKIN PREP TRAY: Brand: MEDLINE INDUSTRIES, INC.

## (undated) DEVICE — OCCLUSIVE GAUZE STRIP,3% BISMUTH TRIBROMOPHENATE IN PETROLATUM BLEND: Brand: XEROFORM